# Patient Record
Sex: MALE | Race: WHITE | Employment: OTHER | URBAN - NONMETROPOLITAN AREA
[De-identification: names, ages, dates, MRNs, and addresses within clinical notes are randomized per-mention and may not be internally consistent; named-entity substitution may affect disease eponyms.]

---

## 2018-01-16 PROBLEM — G25.0 ESSENTIAL TREMOR: Status: ACTIVE | Noted: 2018-01-16

## 2018-01-16 RX ORDER — FUROSEMIDE 40 MG
TABLET ORAL
Status: ON HOLD | COMMUNITY
Start: 2008-06-30 | End: 2020-01-01

## 2018-01-16 RX ORDER — ASPIRIN 81 MG/1
TABLET ORAL
Status: ON HOLD | COMMUNITY
Start: 2008-05-27 | End: 2020-01-01

## 2018-01-16 RX ORDER — ZOLPIDEM TARTRATE 10 MG/1
TABLET ORAL
Status: ON HOLD | COMMUNITY
Start: 2008-05-27 | End: 2020-01-01

## 2018-01-16 RX ORDER — DIPHENOXYLATE HYDROCHLORIDE AND ATROPINE SULFATE 2.5; .025 MG/1; MG/1
TABLET ORAL
Status: ON HOLD | COMMUNITY
Start: 2008-05-27 | End: 2020-01-01

## 2018-01-16 RX ORDER — CHLORAL HYDRATE 500 MG
CAPSULE ORAL
Status: ON HOLD | COMMUNITY
Start: 2008-05-27 | End: 2020-01-01

## 2018-01-16 RX ORDER — DIPHENHYDRAMINE HCL 50 MG
CAPSULE ORAL
Status: ON HOLD | COMMUNITY
Start: 2008-05-27 | End: 2020-01-01

## 2020-01-01 ENCOUNTER — ANESTHESIA EVENT (OUTPATIENT)
Dept: MEDSURG UNIT | Facility: OTHER | Age: 85
DRG: 872 | End: 2020-01-01
Payer: MEDICARE

## 2020-01-01 ENCOUNTER — APPOINTMENT (OUTPATIENT)
Dept: GENERAL RADIOLOGY | Facility: OTHER | Age: 85
DRG: 872 | End: 2020-01-01
Attending: INTERNAL MEDICINE
Payer: MEDICARE

## 2020-01-01 ENCOUNTER — HOSPITAL ENCOUNTER (INPATIENT)
Facility: OTHER | Age: 85
LOS: 4 days | Discharge: HOME | DRG: 872 | End: 2020-11-13
Attending: FAMILY MEDICINE | Admitting: FAMILY MEDICINE
Payer: MEDICARE

## 2020-01-01 ENCOUNTER — ANESTHESIA (OUTPATIENT)
Dept: MEDSURG UNIT | Facility: OTHER | Age: 85
DRG: 872 | End: 2020-01-01
Payer: MEDICARE

## 2020-01-01 ENCOUNTER — NURSE TRIAGE (OUTPATIENT)
Dept: NURSING | Facility: CLINIC | Age: 85
End: 2020-01-01

## 2020-01-01 ENCOUNTER — APPOINTMENT (OUTPATIENT)
Dept: OCCUPATIONAL THERAPY | Facility: OTHER | Age: 85
DRG: 872 | End: 2020-01-01
Attending: FAMILY MEDICINE
Payer: MEDICARE

## 2020-01-01 ENCOUNTER — APPOINTMENT (OUTPATIENT)
Dept: PHYSICAL THERAPY | Facility: OTHER | Age: 85
DRG: 872 | End: 2020-01-01
Attending: FAMILY MEDICINE
Payer: MEDICARE

## 2020-01-01 ENCOUNTER — APPOINTMENT (OUTPATIENT)
Dept: GENERAL RADIOLOGY | Facility: OTHER | Age: 85
DRG: 872 | End: 2020-01-01
Attending: FAMILY MEDICINE
Payer: MEDICARE

## 2020-01-01 ENCOUNTER — APPOINTMENT (OUTPATIENT)
Dept: CARDIOLOGY | Facility: OTHER | Age: 85
DRG: 872 | End: 2020-01-01
Attending: FAMILY MEDICINE
Payer: MEDICARE

## 2020-01-01 VITALS
TEMPERATURE: 98 F | BODY MASS INDEX: 31.1 KG/M2 | DIASTOLIC BLOOD PRESSURE: 63 MMHG | OXYGEN SATURATION: 97 % | RESPIRATION RATE: 18 BRPM | HEART RATE: 92 BPM | WEIGHT: 229.6 LBS | HEIGHT: 72 IN | SYSTOLIC BLOOD PRESSURE: 117 MMHG

## 2020-01-01 DIAGNOSIS — L08.9 SKIN INFECTION: Primary | ICD-10-CM

## 2020-01-01 LAB
ALBUMIN SERPL-MCNC: 3.2 G/DL (ref 3.5–5.7)
ALBUMIN SERPL-MCNC: 3.4 G/DL (ref 3.5–5.7)
ALP SERPL-CCNC: 51 U/L (ref 34–104)
ALP SERPL-CCNC: 53 U/L (ref 34–104)
ALT SERPL W P-5'-P-CCNC: 6 U/L (ref 7–52)
ALT SERPL W P-5'-P-CCNC: 6 U/L (ref 7–52)
ANION GAP SERPL CALCULATED.3IONS-SCNC: 10 MMOL/L (ref 3–14)
ANION GAP SERPL CALCULATED.3IONS-SCNC: 10 MMOL/L (ref 3–14)
ANION GAP SERPL CALCULATED.3IONS-SCNC: 11 MMOL/L (ref 3–14)
ANION GAP SERPL CALCULATED.3IONS-SCNC: 11 MMOL/L (ref 3–14)
ANION GAP SERPL CALCULATED.3IONS-SCNC: 9 MMOL/L (ref 3–14)
AST SERPL W P-5'-P-CCNC: 20 U/L (ref 13–39)
AST SERPL W P-5'-P-CCNC: 27 U/L (ref 13–39)
BILIRUB SERPL-MCNC: 0.6 MG/DL (ref 0.3–1)
BILIRUB SERPL-MCNC: 0.8 MG/DL (ref 0.3–1)
BUN SERPL-MCNC: 23 MG/DL (ref 7–25)
BUN SERPL-MCNC: 24 MG/DL (ref 7–25)
BUN SERPL-MCNC: 29 MG/DL (ref 7–25)
BUN SERPL-MCNC: 37 MG/DL (ref 7–25)
BUN SERPL-MCNC: 40 MG/DL (ref 7–25)
CALCIUM SERPL-MCNC: 8.1 MG/DL (ref 8.6–10.3)
CALCIUM SERPL-MCNC: 8.2 MG/DL (ref 8.6–10.3)
CALCIUM SERPL-MCNC: 8.2 MG/DL (ref 8.6–10.3)
CALCIUM SERPL-MCNC: 8.4 MG/DL (ref 8.6–10.3)
CALCIUM SERPL-MCNC: 8.7 MG/DL (ref 8.6–10.3)
CHLORIDE SERPL-SCNC: 101 MMOL/L (ref 98–107)
CHLORIDE SERPL-SCNC: 101 MMOL/L (ref 98–107)
CHLORIDE SERPL-SCNC: 102 MMOL/L (ref 98–107)
CHLORIDE SERPL-SCNC: 103 MMOL/L (ref 98–107)
CHLORIDE SERPL-SCNC: 99 MMOL/L (ref 98–107)
CO2 SERPL-SCNC: 27 MMOL/L (ref 21–31)
CO2 SERPL-SCNC: 27 MMOL/L (ref 21–31)
CO2 SERPL-SCNC: 30 MMOL/L (ref 21–31)
CO2 SERPL-SCNC: 32 MMOL/L (ref 21–31)
CO2 SERPL-SCNC: 32 MMOL/L (ref 21–31)
CREAT SERPL-MCNC: 1.2 MG/DL (ref 0.7–1.3)
CREAT SERPL-MCNC: 1.27 MG/DL (ref 0.7–1.3)
CREAT SERPL-MCNC: 1.31 MG/DL (ref 0.7–1.3)
CREAT SERPL-MCNC: 1.65 MG/DL (ref 0.7–1.3)
CREAT SERPL-MCNC: 1.83 MG/DL (ref 0.7–1.3)
ERYTHROCYTE [DISTWIDTH] IN BLOOD BY AUTOMATED COUNT: 14 % (ref 10–15)
ERYTHROCYTE [DISTWIDTH] IN BLOOD BY AUTOMATED COUNT: 14.1 % (ref 10–15)
ERYTHROCYTE [DISTWIDTH] IN BLOOD BY AUTOMATED COUNT: 14.1 % (ref 10–15)
ERYTHROCYTE [DISTWIDTH] IN BLOOD BY AUTOMATED COUNT: 14.2 % (ref 10–15)
GFR SERPL CREATININE-BSD FRML MDRD: 35 ML/MIN/{1.73_M2}
GFR SERPL CREATININE-BSD FRML MDRD: 39 ML/MIN/{1.73_M2}
GFR SERPL CREATININE-BSD FRML MDRD: 51 ML/MIN/{1.73_M2}
GFR SERPL CREATININE-BSD FRML MDRD: 53 ML/MIN/{1.73_M2}
GFR SERPL CREATININE-BSD FRML MDRD: 57 ML/MIN/{1.73_M2}
GLUCOSE SERPL-MCNC: 102 MG/DL (ref 70–105)
GLUCOSE SERPL-MCNC: 120 MG/DL (ref 70–105)
GLUCOSE SERPL-MCNC: 135 MG/DL (ref 70–105)
GLUCOSE SERPL-MCNC: 137 MG/DL (ref 70–105)
GLUCOSE SERPL-MCNC: 99 MG/DL (ref 70–105)
HCT VFR BLD AUTO: 32.3 % (ref 40–53)
HCT VFR BLD AUTO: 32.7 % (ref 40–53)
HCT VFR BLD AUTO: 34.2 % (ref 40–53)
HCT VFR BLD AUTO: 35.3 % (ref 40–53)
HGB BLD-MCNC: 10.6 G/DL (ref 13.3–17.7)
HGB BLD-MCNC: 10.6 G/DL (ref 13.3–17.7)
HGB BLD-MCNC: 11.1 G/DL (ref 13.3–17.7)
HGB BLD-MCNC: 11.1 G/DL (ref 13.3–17.7)
INR PPP: 1.9 (ref 0.86–1.14)
INR PPP: 1.98 (ref 0.86–1.14)
INTERPRETATION ECG - MUSE: NORMAL
INTERPRETATION ECG - MUSE: NORMAL
LACTATE BLD-SCNC: 1.5 MMOL/L (ref 0.7–2)
MAGNESIUM SERPL-MCNC: 2.1 MG/DL (ref 1.9–2.7)
MCH RBC QN AUTO: 30.3 PG (ref 26.5–33)
MCH RBC QN AUTO: 30.7 PG (ref 26.5–33)
MCH RBC QN AUTO: 31.1 PG (ref 26.5–33)
MCH RBC QN AUTO: 31.1 PG (ref 26.5–33)
MCHC RBC AUTO-ENTMCNC: 31.4 G/DL (ref 31.5–36.5)
MCHC RBC AUTO-ENTMCNC: 32.4 G/DL (ref 31.5–36.5)
MCHC RBC AUTO-ENTMCNC: 32.5 G/DL (ref 31.5–36.5)
MCHC RBC AUTO-ENTMCNC: 32.8 G/DL (ref 31.5–36.5)
MCV RBC AUTO: 95 FL (ref 78–100)
MCV RBC AUTO: 95 FL (ref 78–100)
MCV RBC AUTO: 96 FL (ref 78–100)
MCV RBC AUTO: 96 FL (ref 78–100)
NT-PROBNP SERPL-MCNC: 734 PG/ML (ref 0–100)
PLATELET # BLD AUTO: 205 10E9/L (ref 150–450)
PLATELET # BLD AUTO: 214 10E9/L (ref 150–450)
PLATELET # BLD AUTO: 221 10E9/L (ref 150–450)
PLATELET # BLD AUTO: 227 10E9/L (ref 150–450)
POTASSIUM SERPL-SCNC: 3.1 MMOL/L (ref 3.5–5.1)
POTASSIUM SERPL-SCNC: 3.1 MMOL/L (ref 3.5–5.1)
POTASSIUM SERPL-SCNC: 3.2 MMOL/L (ref 3.5–5.1)
POTASSIUM SERPL-SCNC: 3.5 MMOL/L (ref 3.5–5.1)
POTASSIUM SERPL-SCNC: 3.6 MMOL/L (ref 3.5–5.1)
POTASSIUM SERPL-SCNC: 4.2 MMOL/L (ref 3.5–5.1)
PROCALCITONIN SERPL-MCNC: 1.73 NG/ML
PROCALCITONIN SERPL-MCNC: 2.72 NG/ML
PROT SERPL-MCNC: 5.7 G/DL (ref 6.4–8.9)
PROT SERPL-MCNC: 6.1 G/DL (ref 6.4–8.9)
RBC # BLD AUTO: 3.41 10E12/L (ref 4.4–5.9)
RBC # BLD AUTO: 3.41 10E12/L (ref 4.4–5.9)
RBC # BLD AUTO: 3.61 10E12/L (ref 4.4–5.9)
RBC # BLD AUTO: 3.66 10E12/L (ref 4.4–5.9)
SODIUM SERPL-SCNC: 139 MMOL/L (ref 134–144)
SODIUM SERPL-SCNC: 139 MMOL/L (ref 134–144)
SODIUM SERPL-SCNC: 140 MMOL/L (ref 134–144)
SODIUM SERPL-SCNC: 143 MMOL/L (ref 134–144)
SODIUM SERPL-SCNC: 144 MMOL/L (ref 134–144)
TROPONIN I SERPL-MCNC: 10.8 PG/ML
TSH SERPL DL<=0.05 MIU/L-ACNC: 1.21 IU/ML (ref 0.34–5.6)
WBC # BLD AUTO: 10.2 10E9/L (ref 4–11)
WBC # BLD AUTO: 10.6 10E9/L (ref 4–11)
WBC # BLD AUTO: 10.9 10E9/L (ref 4–11)
WBC # BLD AUTO: 8.8 10E9/L (ref 4–11)

## 2020-01-01 PROCEDURE — 99233 SBSQ HOSP IP/OBS HIGH 50: CPT | Performed by: FAMILY MEDICINE

## 2020-01-01 PROCEDURE — 99232 SBSQ HOSP IP/OBS MODERATE 35: CPT | Performed by: FAMILY MEDICINE

## 2020-01-01 PROCEDURE — G0378 HOSPITAL OBSERVATION PER HR: HCPCS

## 2020-01-01 PROCEDURE — 84443 ASSAY THYROID STIM HORMONE: CPT | Performed by: FAMILY MEDICINE

## 2020-01-01 PROCEDURE — 36415 COLL VENOUS BLD VENIPUNCTURE: CPT | Performed by: FAMILY MEDICINE

## 2020-01-01 PROCEDURE — 250N000011 HC RX IP 250 OP 636: Performed by: FAMILY MEDICINE

## 2020-01-01 PROCEDURE — 84145 PROCALCITONIN (PCT): CPT | Performed by: FAMILY MEDICINE

## 2020-01-01 PROCEDURE — 97116 GAIT TRAINING THERAPY: CPT | Mod: GP

## 2020-01-01 PROCEDURE — 85027 COMPLETE CBC AUTOMATED: CPT | Performed by: FAMILY MEDICINE

## 2020-01-01 PROCEDURE — 84484 ASSAY OF TROPONIN QUANT: CPT | Performed by: FAMILY MEDICINE

## 2020-01-01 PROCEDURE — 83605 ASSAY OF LACTIC ACID: CPT | Performed by: FAMILY MEDICINE

## 2020-01-01 PROCEDURE — 99100 ANES PT EXTEME AGE<1 YR&>70: CPT | Performed by: NURSE ANESTHETIST, CERTIFIED REGISTERED

## 2020-01-01 PROCEDURE — 85610 PROTHROMBIN TIME: CPT | Performed by: FAMILY MEDICINE

## 2020-01-01 PROCEDURE — 92960 CARDIOVERSION ELECTRIC EXT: CPT | Performed by: FAMILY MEDICINE

## 2020-01-01 PROCEDURE — 80048 BASIC METABOLIC PNL TOTAL CA: CPT | Performed by: FAMILY MEDICINE

## 2020-01-01 PROCEDURE — 83880 ASSAY OF NATRIURETIC PEPTIDE: CPT | Performed by: FAMILY MEDICINE

## 2020-01-01 PROCEDURE — 93306 TTE W/DOPPLER COMPLETE: CPT

## 2020-01-01 PROCEDURE — 999N000157 HC STATISTIC RCP TIME EA 10 MIN

## 2020-01-01 PROCEDURE — 120N000001 HC R&B MED SURG/OB

## 2020-01-01 PROCEDURE — 250N000013 HC RX MED GY IP 250 OP 250 PS 637: Performed by: FAMILY MEDICINE

## 2020-01-01 PROCEDURE — 80053 COMPREHEN METABOLIC PANEL: CPT | Performed by: FAMILY MEDICINE

## 2020-01-01 PROCEDURE — 02HV33Z INSERTION OF INFUSION DEVICE INTO SUPERIOR VENA CAVA, PERCUTANEOUS APPROACH: ICD-10-PCS | Performed by: INTERNAL MEDICINE

## 2020-01-01 PROCEDURE — 250N000009 HC RX 250: Performed by: FAMILY MEDICINE

## 2020-01-01 PROCEDURE — 92960 CARDIOVERSION ELECTRIC EXT: CPT | Performed by: NURSE ANESTHETIST, CERTIFIED REGISTERED

## 2020-01-01 PROCEDURE — 250N000013 HC RX MED GY IP 250 OP 250 PS 637: Performed by: INTERNAL MEDICINE

## 2020-01-01 PROCEDURE — 97530 THERAPEUTIC ACTIVITIES: CPT | Mod: GP

## 2020-01-01 PROCEDURE — 71046 X-RAY EXAM CHEST 2 VIEWS: CPT

## 2020-01-01 PROCEDURE — 258N000003 HC RX IP 258 OP 636: Performed by: FAMILY MEDICINE

## 2020-01-01 PROCEDURE — 93010 ELECTROCARDIOGRAM REPORT: CPT | Performed by: INTERNAL MEDICINE

## 2020-01-01 PROCEDURE — 200N000001 HC R&B ICU

## 2020-01-01 PROCEDURE — 5A2204Z RESTORATION OF CARDIAC RHYTHM, SINGLE: ICD-10-PCS | Performed by: FAMILY MEDICINE

## 2020-01-01 PROCEDURE — 97165 OT EVAL LOW COMPLEX 30 MIN: CPT | Mod: GO

## 2020-01-01 PROCEDURE — 99223 1ST HOSP IP/OBS HIGH 75: CPT | Mod: 25 | Performed by: FAMILY MEDICINE

## 2020-01-01 PROCEDURE — 93306 TTE W/DOPPLER COMPLETE: CPT | Mod: 26 | Performed by: INTERNAL MEDICINE

## 2020-01-01 PROCEDURE — 99140 ANES COMP EMERGENCY COND: CPT | Performed by: NURSE ANESTHETIST, CERTIFIED REGISTERED

## 2020-01-01 PROCEDURE — 84132 ASSAY OF SERUM POTASSIUM: CPT | Performed by: FAMILY MEDICINE

## 2020-01-01 PROCEDURE — 97535 SELF CARE MNGMENT TRAINING: CPT | Mod: GO

## 2020-01-01 PROCEDURE — 99239 HOSP IP/OBS DSCHRG MGMT >30: CPT | Performed by: FAMILY MEDICINE

## 2020-01-01 PROCEDURE — 71045 X-RAY EXAM CHEST 1 VIEW: CPT

## 2020-01-01 PROCEDURE — 93005 ELECTROCARDIOGRAM TRACING: CPT

## 2020-01-01 PROCEDURE — 83735 ASSAY OF MAGNESIUM: CPT | Performed by: FAMILY MEDICINE

## 2020-01-01 PROCEDURE — 97530 THERAPEUTIC ACTIVITIES: CPT | Mod: GO

## 2020-01-01 PROCEDURE — 258N000003 HC RX IP 258 OP 636: Performed by: NURSE ANESTHETIST, CERTIFIED REGISTERED

## 2020-01-01 PROCEDURE — 36556 INSERT NON-TUNNEL CV CATH: CPT | Performed by: INTERNAL MEDICINE

## 2020-01-01 PROCEDURE — 250N000011 HC RX IP 250 OP 636: Performed by: NURSE ANESTHETIST, CERTIFIED REGISTERED

## 2020-01-01 PROCEDURE — 97161 PT EVAL LOW COMPLEX 20 MIN: CPT | Mod: GP

## 2020-01-01 RX ORDER — ZOLPIDEM TARTRATE 5 MG/1
5 TABLET ORAL
Status: DISCONTINUED | OUTPATIENT
Start: 2020-01-01 | End: 2020-01-01 | Stop reason: HOSPADM

## 2020-01-01 RX ORDER — POTASSIUM CHLORIDE 7.45 MG/ML
10 INJECTION INTRAVENOUS ONCE
Status: COMPLETED | OUTPATIENT
Start: 2020-01-01 | End: 2020-01-01

## 2020-01-01 RX ORDER — METOPROLOL SUCCINATE 50 MG/1
50 TABLET, EXTENDED RELEASE ORAL DAILY
Status: DISCONTINUED | OUTPATIENT
Start: 2020-01-01 | End: 2020-01-01

## 2020-01-01 RX ORDER — LIDOCAINE 40 MG/G
CREAM TOPICAL
Status: DISCONTINUED | OUTPATIENT
Start: 2020-01-01 | End: 2020-01-01 | Stop reason: HOSPADM

## 2020-01-01 RX ORDER — FUROSEMIDE 10 MG/ML
60 INJECTION INTRAMUSCULAR; INTRAVENOUS EVERY 8 HOURS
Status: DISCONTINUED | OUTPATIENT
Start: 2020-01-01 | End: 2020-01-01

## 2020-01-01 RX ORDER — FUROSEMIDE 80 MG
80 TABLET ORAL 2 TIMES DAILY
COMMUNITY
Start: 2019-12-06

## 2020-01-01 RX ORDER — ACETAMINOPHEN 325 MG/1
650 TABLET ORAL EVERY 4 HOURS PRN
Status: DISCONTINUED | OUTPATIENT
Start: 2020-01-01 | End: 2020-01-01 | Stop reason: HOSPADM

## 2020-01-01 RX ORDER — ASPIRIN 81 MG/1
81 TABLET ORAL DAILY
Status: DISCONTINUED | OUTPATIENT
Start: 2020-01-01 | End: 2020-01-01

## 2020-01-01 RX ORDER — FUROSEMIDE 10 MG/ML
40 INJECTION INTRAMUSCULAR; INTRAVENOUS EVERY 8 HOURS
Status: DISCONTINUED | OUTPATIENT
Start: 2020-01-01 | End: 2020-01-01

## 2020-01-01 RX ORDER — BENZOCAINE/MENTHOL 6 MG-10 MG
LOZENGE MUCOUS MEMBRANE 2 TIMES DAILY PRN
Status: DISCONTINUED | OUTPATIENT
Start: 2020-01-01 | End: 2020-01-01 | Stop reason: HOSPADM

## 2020-01-01 RX ORDER — CEFAZOLIN SODIUM 1 G/50ML
1 INJECTION, SOLUTION INTRAVENOUS EVERY 8 HOURS SCHEDULED
Status: DISCONTINUED | OUTPATIENT
Start: 2020-01-01 | End: 2020-01-01

## 2020-01-01 RX ORDER — FUROSEMIDE 80 MG
80 TABLET ORAL
Status: DISCONTINUED | OUTPATIENT
Start: 2020-01-01 | End: 2020-01-01

## 2020-01-01 RX ORDER — POTASSIUM CHLORIDE 1.5 G/1.58G
40 POWDER, FOR SOLUTION ORAL ONCE
Status: COMPLETED | OUTPATIENT
Start: 2020-01-01 | End: 2020-01-01

## 2020-01-01 RX ORDER — FUROSEMIDE 40 MG
40 TABLET ORAL DAILY
Status: DISCONTINUED | OUTPATIENT
Start: 2020-01-01 | End: 2020-01-01

## 2020-01-01 RX ORDER — METOPROLOL TARTRATE 1 MG/ML
5 INJECTION, SOLUTION INTRAVENOUS EVERY 5 MIN PRN
Status: DISCONTINUED | OUTPATIENT
Start: 2020-01-01 | End: 2020-01-01

## 2020-01-01 RX ORDER — PROPOFOL 10 MG/ML
INJECTION, EMULSION INTRAVENOUS PRN
Status: DISCONTINUED | OUTPATIENT
Start: 2020-01-01 | End: 2020-01-01

## 2020-01-01 RX ORDER — ACETAMINOPHEN 650 MG/1
650 SUPPOSITORY RECTAL EVERY 4 HOURS PRN
Status: DISCONTINUED | OUTPATIENT
Start: 2020-01-01 | End: 2020-01-01 | Stop reason: HOSPADM

## 2020-01-01 RX ORDER — POTASSIUM CHLORIDE 1500 MG/1
40 TABLET, EXTENDED RELEASE ORAL ONCE
Status: COMPLETED | OUTPATIENT
Start: 2020-01-01 | End: 2020-01-01

## 2020-01-01 RX ORDER — PRAMIPEXOLE DIHYDROCHLORIDE 0.12 MG/1
0.12 TABLET ORAL
Status: DISCONTINUED | OUTPATIENT
Start: 2020-01-01 | End: 2020-01-01 | Stop reason: HOSPADM

## 2020-01-01 RX ORDER — LEVOTHYROXINE SODIUM 75 UG/1
75 TABLET ORAL DAILY
Status: DISCONTINUED | OUTPATIENT
Start: 2020-01-01 | End: 2020-01-01 | Stop reason: HOSPADM

## 2020-01-01 RX ORDER — ONDANSETRON 2 MG/ML
4 INJECTION INTRAMUSCULAR; INTRAVENOUS EVERY 6 HOURS PRN
Status: DISCONTINUED | OUTPATIENT
Start: 2020-01-01 | End: 2020-01-01 | Stop reason: HOSPADM

## 2020-01-01 RX ORDER — PRAMIPEXOLE DIHYDROCHLORIDE 0.12 MG/1
0.12 TABLET ORAL 2 TIMES DAILY
COMMUNITY
Start: 2020-01-01

## 2020-01-01 RX ORDER — NOREPINEPHRINE BITARTRATE 0.02 MG/ML
.03-.125 INJECTION, SOLUTION INTRAVENOUS CONTINUOUS
Status: DISCONTINUED | OUTPATIENT
Start: 2020-01-01 | End: 2020-01-01

## 2020-01-01 RX ORDER — NOREPINEPHRINE BITARTRATE 0.06 MG/ML
.03-.4 INJECTION, SOLUTION INTRAVENOUS CONTINUOUS
Status: DISCONTINUED | OUTPATIENT
Start: 2020-01-01 | End: 2020-01-01

## 2020-01-01 RX ORDER — PROCHLORPERAZINE 25 MG
12.5 SUPPOSITORY, RECTAL RECTAL EVERY 12 HOURS PRN
Status: DISCONTINUED | OUTPATIENT
Start: 2020-01-01 | End: 2020-01-01 | Stop reason: HOSPADM

## 2020-01-01 RX ORDER — HYDROXYZINE PAMOATE 25 MG/1
25 CAPSULE ORAL 3 TIMES DAILY PRN
Status: DISCONTINUED | OUTPATIENT
Start: 2020-01-01 | End: 2020-01-01 | Stop reason: HOSPADM

## 2020-01-01 RX ORDER — LEVOTHYROXINE SODIUM 75 UG/1
75 TABLET ORAL DAILY
COMMUNITY
Start: 2020-02-20

## 2020-01-01 RX ORDER — PRIMIDONE 50 MG/1
25 TABLET ORAL DAILY
COMMUNITY
Start: 2020-01-01

## 2020-01-01 RX ORDER — FUROSEMIDE 80 MG
80 TABLET ORAL
Status: DISCONTINUED | OUTPATIENT
Start: 2020-01-01 | End: 2020-01-01 | Stop reason: HOSPADM

## 2020-01-01 RX ORDER — TRAMADOL HYDROCHLORIDE 50 MG/1
50 TABLET ORAL 2 TIMES DAILY PRN
COMMUNITY
Start: 2020-01-01

## 2020-01-01 RX ORDER — NALOXONE HYDROCHLORIDE 0.4 MG/ML
.1-.4 INJECTION, SOLUTION INTRAMUSCULAR; INTRAVENOUS; SUBCUTANEOUS
Status: DISCONTINUED | OUTPATIENT
Start: 2020-01-01 | End: 2020-01-01 | Stop reason: HOSPADM

## 2020-01-01 RX ORDER — ONDANSETRON 4 MG/1
4 TABLET, ORALLY DISINTEGRATING ORAL EVERY 6 HOURS PRN
Status: DISCONTINUED | OUTPATIENT
Start: 2020-01-01 | End: 2020-01-01 | Stop reason: HOSPADM

## 2020-01-01 RX ORDER — METOPROLOL SUCCINATE 100 MG/1
200 TABLET, EXTENDED RELEASE ORAL DAILY
Status: DISCONTINUED | OUTPATIENT
Start: 2020-01-01 | End: 2020-01-01

## 2020-01-01 RX ORDER — SODIUM CHLORIDE 9 MG/ML
INJECTION, SOLUTION INTRAVENOUS CONTINUOUS
Status: DISCONTINUED | OUTPATIENT
Start: 2020-01-01 | End: 2020-01-01

## 2020-01-01 RX ORDER — HYDROCODONE BITARTRATE AND ACETAMINOPHEN 5; 325 MG/1; MG/1
1-2 TABLET ORAL EVERY 4 HOURS PRN
Status: DISCONTINUED | OUTPATIENT
Start: 2020-01-01 | End: 2020-01-01 | Stop reason: HOSPADM

## 2020-01-01 RX ORDER — PROCHLORPERAZINE MALEATE 5 MG
5 TABLET ORAL EVERY 6 HOURS PRN
Status: DISCONTINUED | OUTPATIENT
Start: 2020-01-01 | End: 2020-01-01 | Stop reason: HOSPADM

## 2020-01-01 RX ORDER — METOPROLOL SUCCINATE 100 MG/1
200 TABLET, EXTENDED RELEASE ORAL DAILY
COMMUNITY
Start: 2020-01-01

## 2020-01-01 RX ORDER — POTASSIUM CHLORIDE 750 MG/1
20 TABLET, EXTENDED RELEASE ORAL 2 TIMES DAILY
COMMUNITY
Start: 2020-01-22

## 2020-01-01 RX ORDER — TRAMADOL HYDROCHLORIDE 50 MG/1
50 TABLET ORAL EVERY 6 HOURS PRN
Status: DISCONTINUED | OUTPATIENT
Start: 2020-01-01 | End: 2020-01-01 | Stop reason: HOSPADM

## 2020-01-01 RX ADMIN — PHENYLEPHRINE HYDROCHLORIDE 150 MCG: 10 INJECTION INTRAVENOUS at 01:41

## 2020-01-01 RX ADMIN — FUROSEMIDE 40 MG: 10 INJECTION, SOLUTION INTRAVENOUS at 11:57

## 2020-01-01 RX ADMIN — POTASSIUM CHLORIDE 40 MEQ: 1500 TABLET, EXTENDED RELEASE ORAL at 06:30

## 2020-01-01 RX ADMIN — FUROSEMIDE 40 MG: 10 INJECTION, SOLUTION INTRAVENOUS at 10:37

## 2020-01-01 RX ADMIN — POTASSIUM CHLORIDE 10 MEQ: 7.46 INJECTION, SOLUTION INTRAVENOUS at 12:17

## 2020-01-01 RX ADMIN — QUETIAPINE 12.5 MG: 25 TABLET ORAL at 21:26

## 2020-01-01 RX ADMIN — NOREPINEPHRINE BITARTRATE 4 MG/250 ML (16 MCG/ML) IN 0.9 % NACL IV 0.07 MCG/KG/MIN: at 10:32

## 2020-01-01 RX ADMIN — PRAMIPEXOLE DIHYDROCHLORIDE 0.12 MG: 0.12 TABLET ORAL at 19:56

## 2020-01-01 RX ADMIN — LEVOTHYROXINE SODIUM 75 MCG: 0.07 TABLET ORAL at 07:52

## 2020-01-01 RX ADMIN — PROPOFOL 50 MG: 10 INJECTION, EMULSION INTRAVENOUS at 01:43

## 2020-01-01 RX ADMIN — HYDROXYZINE PAMOATE 25 MG: 25 CAPSULE ORAL at 18:38

## 2020-01-01 RX ADMIN — FUROSEMIDE 40 MG: 10 INJECTION, SOLUTION INTRAVENOUS at 09:21

## 2020-01-01 RX ADMIN — FUROSEMIDE 80 MG: 80 TABLET ORAL at 15:07

## 2020-01-01 RX ADMIN — AMOXICILLIN AND CLAVULANATE POTASSIUM 1 TABLET: 875; 125 TABLET, FILM COATED ORAL at 19:38

## 2020-01-01 RX ADMIN — METOPROLOL TARTRATE 5 MG: 1 INJECTION, SOLUTION INTRAVENOUS at 01:08

## 2020-01-01 RX ADMIN — FUROSEMIDE 40 MG: 10 INJECTION, SOLUTION INTRAVENOUS at 18:38

## 2020-01-01 RX ADMIN — RIVAROXABAN 15 MG: 15 TABLET, FILM COATED ORAL at 09:20

## 2020-01-01 RX ADMIN — FUROSEMIDE 40 MG: 10 INJECTION, SOLUTION INTRAVENOUS at 09:10

## 2020-01-01 RX ADMIN — HYDROCODONE BITARTRATE AND ACETAMINOPHEN 2 TABLET: 5; 325 TABLET ORAL at 23:29

## 2020-01-01 RX ADMIN — HYDROCORTISONE: 1 CREAM TOPICAL at 03:24

## 2020-01-01 RX ADMIN — TRAMADOL HYDROCHLORIDE 50 MG: 50 TABLET, FILM COATED ORAL at 06:25

## 2020-01-01 RX ADMIN — FUROSEMIDE 40 MG: 10 INJECTION, SOLUTION INTRAVENOUS at 17:35

## 2020-01-01 RX ADMIN — FUROSEMIDE 80 MG: 80 TABLET ORAL at 07:41

## 2020-01-01 RX ADMIN — PRAMIPEXOLE DIHYDROCHLORIDE 0.12 MG: 0.12 TABLET ORAL at 22:08

## 2020-01-01 RX ADMIN — LEVOTHYROXINE SODIUM 75 MCG: 0.07 TABLET ORAL at 07:44

## 2020-01-01 RX ADMIN — NOREPINEPHRINE BITARTRATE 4 MG/250 ML (16 MCG/ML) IN 0.9 % NACL IV 0.03 MCG/KG/MIN: at 02:21

## 2020-01-01 RX ADMIN — HYDROXYZINE PAMOATE 25 MG: 25 CAPSULE ORAL at 15:18

## 2020-01-01 RX ADMIN — POTASSIUM CHLORIDE 10 MEQ: 7.46 INJECTION, SOLUTION INTRAVENOUS at 01:44

## 2020-01-01 RX ADMIN — ACETAMINOPHEN 650 MG: 325 TABLET, FILM COATED ORAL at 02:41

## 2020-01-01 RX ADMIN — CEFAZOLIN SODIUM 1 G: 1 INJECTION, SOLUTION INTRAVENOUS at 14:25

## 2020-01-01 RX ADMIN — FUROSEMIDE 40 MG: 10 INJECTION, SOLUTION INTRAVENOUS at 02:18

## 2020-01-01 RX ADMIN — CEFAZOLIN SODIUM 1 G: 1 INJECTION, SOLUTION INTRAVENOUS at 22:23

## 2020-01-01 RX ADMIN — PHENYLEPHRINE HYDROCHLORIDE 150 MCG: 10 INJECTION INTRAVENOUS at 01:46

## 2020-01-01 RX ADMIN — CEFAZOLIN SODIUM 1 G: 1 INJECTION, SOLUTION INTRAVENOUS at 07:15

## 2020-01-01 RX ADMIN — FUROSEMIDE 40 MG: 10 INJECTION, SOLUTION INTRAVENOUS at 02:20

## 2020-01-01 RX ADMIN — FUROSEMIDE 40 MG: 10 INJECTION, SOLUTION INTRAVENOUS at 01:37

## 2020-01-01 RX ADMIN — POTASSIUM CHLORIDE 10 MEQ: 7.46 INJECTION, SOLUTION INTRAVENOUS at 14:21

## 2020-01-01 RX ADMIN — CEFAZOLIN SODIUM 1 G: 1 INJECTION, SOLUTION INTRAVENOUS at 22:08

## 2020-01-01 RX ADMIN — FUROSEMIDE 40 MG: 10 INJECTION, SOLUTION INTRAVENOUS at 18:28

## 2020-01-01 RX ADMIN — RIVAROXABAN 15 MG: 15 TABLET, FILM COATED ORAL at 10:44

## 2020-01-01 RX ADMIN — SODIUM CHLORIDE: 9 INJECTION, SOLUTION INTRAVENOUS at 14:22

## 2020-01-01 RX ADMIN — ACETAMINOPHEN 650 MG: 325 TABLET, FILM COATED ORAL at 13:35

## 2020-01-01 RX ADMIN — AMOXICILLIN AND CLAVULANATE POTASSIUM 1 TABLET: 875; 125 TABLET, FILM COATED ORAL at 12:27

## 2020-01-01 RX ADMIN — CEFAZOLIN SODIUM 1 G: 1 INJECTION, SOLUTION INTRAVENOUS at 14:40

## 2020-01-01 RX ADMIN — PHENYLEPHRINE HYDROCHLORIDE 100 MCG: 10 INJECTION INTRAVENOUS at 01:48

## 2020-01-01 RX ADMIN — LEVOTHYROXINE SODIUM 75 MCG: 0.07 TABLET ORAL at 08:39

## 2020-01-01 RX ADMIN — POTASSIUM CHLORIDE 10 MEQ: 7.46 INJECTION, SOLUTION INTRAVENOUS at 09:43

## 2020-01-01 RX ADMIN — HYDROCODONE BITARTRATE AND ACETAMINOPHEN 1 TABLET: 5; 325 TABLET ORAL at 14:41

## 2020-01-01 RX ADMIN — FUROSEMIDE 40 MG: 10 INJECTION, SOLUTION INTRAVENOUS at 02:38

## 2020-01-01 RX ADMIN — HYDROXYZINE PAMOATE 25 MG: 25 CAPSULE ORAL at 12:59

## 2020-01-01 RX ADMIN — POTASSIUM CHLORIDE 10 MEQ: 7.46 INJECTION, SOLUTION INTRAVENOUS at 02:58

## 2020-01-01 RX ADMIN — RIVAROXABAN 15 MG: 15 TABLET, FILM COATED ORAL at 10:36

## 2020-01-01 RX ADMIN — HYDROXYZINE PAMOATE 25 MG: 25 CAPSULE ORAL at 00:39

## 2020-01-01 RX ADMIN — PHENYLEPHRINE HYDROCHLORIDE 200 MCG: 10 INJECTION INTRAVENOUS at 02:04

## 2020-01-01 RX ADMIN — HYDROXYZINE PAMOATE 25 MG: 25 CAPSULE ORAL at 02:18

## 2020-01-01 RX ADMIN — SODIUM CHLORIDE: 9 INJECTION, SOLUTION INTRAVENOUS at 02:38

## 2020-01-01 RX ADMIN — CEFAZOLIN SODIUM 1 G: 1 INJECTION, SOLUTION INTRAVENOUS at 22:13

## 2020-01-01 RX ADMIN — LEVOTHYROXINE SODIUM 75 MCG: 0.07 TABLET ORAL at 07:15

## 2020-01-01 RX ADMIN — CEFAZOLIN SODIUM 1 G: 1 INJECTION, SOLUTION INTRAVENOUS at 06:25

## 2020-01-01 RX ADMIN — NOREPINEPHRINE BITARTRATE 4 MG/250 ML (16 MCG/ML) IN 0.9 % NACL IV 0.08 MCG/KG/MIN: at 06:23

## 2020-01-01 RX ADMIN — CEFAZOLIN SODIUM 1 G: 1 INJECTION, SOLUTION INTRAVENOUS at 00:32

## 2020-01-01 RX ADMIN — NOREPINEPHRINE BITARTRATE 4 MG/250 ML (16 MCG/ML) IN 0.9 % NACL IV 0.06 MCG/KG/MIN: at 22:10

## 2020-01-01 RX ADMIN — CEFAZOLIN SODIUM 1 G: 1 INJECTION, SOLUTION INTRAVENOUS at 06:09

## 2020-01-01 RX ADMIN — LEVOTHYROXINE SODIUM 75 MCG: 0.07 TABLET ORAL at 06:30

## 2020-01-01 RX ADMIN — POTASSIUM CHLORIDE 10 MEQ: 7.46 INJECTION, SOLUTION INTRAVENOUS at 10:52

## 2020-01-01 RX ADMIN — HYDROXYZINE PAMOATE 25 MG: 25 CAPSULE ORAL at 22:21

## 2020-01-01 RX ADMIN — FUROSEMIDE 80 MG: 80 TABLET ORAL at 13:35

## 2020-01-01 RX ADMIN — PHENYLEPHRINE HYDROCHLORIDE 200 MCG: 10 INJECTION INTRAVENOUS at 01:59

## 2020-01-01 RX ADMIN — TRAMADOL HYDROCHLORIDE 50 MG: 50 TABLET, FILM COATED ORAL at 22:19

## 2020-01-01 RX ADMIN — CEFAZOLIN SODIUM 1 G: 1 INJECTION, SOLUTION INTRAVENOUS at 15:04

## 2020-01-01 RX ADMIN — ACETAMINOPHEN 650 MG: 325 TABLET, FILM COATED ORAL at 07:51

## 2020-01-01 RX ADMIN — ACETAMINOPHEN 650 MG: 325 TABLET, FILM COATED ORAL at 09:22

## 2020-01-01 RX ADMIN — CEFAZOLIN SODIUM 1 G: 1 INJECTION, SOLUTION INTRAVENOUS at 06:01

## 2020-01-01 RX ADMIN — RIVAROXABAN 15 MG: 15 TABLET, FILM COATED ORAL at 09:10

## 2020-01-01 RX ADMIN — RIVAROXABAN 15 MG: 15 TABLET, FILM COATED ORAL at 12:04

## 2020-01-01 RX ADMIN — POTASSIUM CHLORIDE 40 MEQ: 1.5 POWDER, FOR SOLUTION ORAL at 08:37

## 2020-01-01 RX ADMIN — AMOXICILLIN AND CLAVULANATE POTASSIUM 1 TABLET: 875; 125 TABLET, FILM COATED ORAL at 07:41

## 2020-01-01 ASSESSMENT — MIFFLIN-ST. JEOR
SCORE: 1765
SCORE: 1734.46
SCORE: 1779
SCORE: 1752

## 2020-01-01 ASSESSMENT — ACTIVITIES OF DAILY LIVING (ADL)
ADLS_ACUITY_SCORE: 19
ADLS_ACUITY_SCORE: 19
DIFFICULTY_EATING/SWALLOWING: NO
ADLS_ACUITY_SCORE: 20
ADLS_ACUITY_SCORE: 24
DRESSING/BATHING_DIFFICULTY: NO
DOING_ERRANDS_INDEPENDENTLY_DIFFICULTY: NO
WALKING_OR_CLIMBING_STAIRS_DIFFICULTY: YES
ADLS_ACUITY_SCORE: 21
ADLS_ACUITY_SCORE: 19
ADLS_ACUITY_SCORE: 20
ADLS_ACUITY_SCORE: 24
ADLS_ACUITY_SCORE: 24
DIFFICULTY_COMMUNICATING: NO
ADLS_ACUITY_SCORE: 20
ADLS_ACUITY_SCORE: 24
ADLS_ACUITY_SCORE: 24
ADLS_ACUITY_SCORE: 19
FALL_HISTORY_WITHIN_LAST_SIX_MONTHS: NO
WALKING_OR_CLIMBING_STAIRS: AMBULATION DIFFICULTY, REQUIRES EQUIPMENT;STAIR CLIMBING DIFFICULTY, REQUIRES EQUIPMENT;TRANSFERRING DIFFICULTY, REQUIRES EQUIPMENT
ADLS_ACUITY_SCORE: 19
ADLS_ACUITY_SCORE: 19
HEARING_DIFFICULTY_OR_DEAF: NO
ADLS_ACUITY_SCORE: 19
EQUIPMENT_CURRENTLY_USED_AT_HOME: WHEELCHAIR, MANUAL;WALKER, STANDARD
ADLS_ACUITY_SCORE: 21
ADLS_ACUITY_SCORE: 20
WEAR_GLASSES_OR_BLIND: YES
ADLS_ACUITY_SCORE: 19
PREVIOUS_RESPONSIBILITIES: MEAL PREP
ADLS_ACUITY_SCORE: 24
CONCENTRATING,_REMEMBERING_OR_MAKING_DECISIONS_DIFFICULTY: NO
ADLS_ACUITY_SCORE: 24
ADLS_ACUITY_SCORE: 19
ADLS_ACUITY_SCORE: 24
TOILETING_ISSUES: NO
ADLS_ACUITY_SCORE: 21
VISION_MANAGEMENT: GLASSES
ADLS_ACUITY_SCORE: 24

## 2020-01-01 ASSESSMENT — ENCOUNTER SYMPTOMS: DYSRHYTHMIAS: 1

## 2020-11-08 PROBLEM — Z79.891 LONG TERM (CURRENT) USE OF OPIATE ANALGESIC: Status: ACTIVE | Noted: 2020-01-01

## 2020-11-08 PROBLEM — L08.9 SKIN INFECTION: Status: ACTIVE | Noted: 2020-01-01

## 2020-11-08 PROBLEM — G89.4 CHRONIC PAIN DISORDER: Status: ACTIVE | Noted: 2020-02-27

## 2020-11-08 PROBLEM — I48.91 ATRIAL FIBRILLATION, UNSPECIFIED TYPE (H): Status: ACTIVE | Noted: 2019-06-25

## 2020-11-09 PROBLEM — I50.9 HEART FAILURE (H): Status: ACTIVE | Noted: 2020-01-01

## 2020-11-09 NOTE — PROGRESS NOTES
Patient premedicated and then shocked with 150 Joules.  Converted to a sinus rhythm, confirmed on a 12 lead EKG.  BP remains hypotensive.

## 2020-11-09 NOTE — ANESTHESIA PREPROCEDURE EVALUATION
Anesthesia Pre-Procedure Evaluation    Patient: Tim Gallardo   MRN: [id number] : 1929          Preoperative Diagnosis: * No pre-op diagnosis entered *    * No procedures listed *    No past medical history on file.  History reviewed. No pertinent surgical history.    Anesthesia Evaluation     .             ROS/MED HX    ENT/Pulmonary:     (+)sleep apnea, uses CPAP , . .    Neurologic:     (+)other neuro Essential Tremor, restless leg syndrome    Cardiovascular: Comment: Lower extremity swelling, current cellulitis     (+) Dyslipidemia, ----. : . . . :. dysrhythmias a-fib, .       METS/Exercise Tolerance:  3 - Able to walk 1-2 blocks without stopping   Hematologic:     (+) Anemia, -      Musculoskeletal:   (+) arthritis,  -       GI/Hepatic:  - neg GI/hepatic ROS       Renal/Genitourinary:     (+) chronic renal disease, type: CRI,       Endo:     (+) type II DM .      Psychiatric:  - neg psychiatric ROS       Infectious Disease:  - neg infectious disease ROS       Malignancy:      - no malignancy   Other:    (+) H/O Chronic Pain,H/O chronic opiod use ,                         Physical Exam  Normal systems: pulmonary    Airway   Mallampati: II  TM distance: >3 FB  Neck ROM: full    Dental   Comment: No loose teeth. Condition ok given patient's age    Cardiovascular   Rhythm and rate: irregular and abnormal      Pulmonary (+) decreased breath sounds               No results found for: WBC, HGB, HCT, PLT, CRP, SED, NA, POTASSIUM, CHLORIDE, CO2, BUN, CR, GLC, AMRIANA, PHOS, MAG, ALBUMIN, PROTTOTAL, ALT, AST, GGT, ALKPHOS, BILITOTAL, BILIDIRECT, LIPASE, AMYLASE, ASHLEIGH, PTT, INR, FIBR, TSH, T4, T3, HCG, HCGS, CKTOTAL, CKMB, TROPN    Preop Vitals  BP Readings from Last 3 Encounters:   20 96/56    Pulse Readings from Last 3 Encounters:   20 122      Resp Readings from Last 3 Encounters:   20 16    SpO2 Readings from Last 3 Encounters:   20 97%      Temp Readings from Last 1 Encounters:    11/08/20 98.2  F (36.8  C) (Tympanic)    Ht Readings from Last 1 Encounters:   11/08/20 1.829 m (6')      Wt Readings from Last 1 Encounters:   11/08/20 109 kg (240 lb 4.8 oz)    Estimated body mass index is 32.59 kg/m  as calculated from the following:    Height as of this encounter: 1.829 m (6').    Weight as of this encounter: 109 kg (240 lb 4.8 oz).       Anesthesia Plan      History & Physical Review      ASA Status:  3 emergent.    NPO Status:  > 6 hours    Plan for MAC (Convert to ETT PRN) with Intravenous and Propofol induction. Reason for MAC:  Chronic cardiopulmonary disease (G9)           Postoperative Care      Consents  Anesthetic plan, risks, benefits and alternatives discussed with:  Patient.  Use of blood products discussed: No .   .                 LUIGI Overton CRNA

## 2020-11-09 NOTE — PROCEDURES
Pt had increased HR, afib RVR up to 120s with hypotension. Unstable narrow complex tachycardia. Decision made for urgent cardioversion. Risks, benefits, side effects and alternatives discussed with patient and his son. Timeout was done. Patient underwent anesthesia. One synchronized shock given at 150J with cardioversion to NSR with PACs. Continues to have some hypotension postprocedure, otherwise doing well.   -patient transferred to ICU  -amiodarone if goes back into afib RVR.   -resume home medications  -remains hypotensive. Start levophed.

## 2020-11-09 NOTE — H&P
[facility]    History and Physical  Hospitalist       Date of Admission:  11/8/2020    Assessment & Plan   Tim Gallardo is a 89 year old male who presents from outside emergency room for bilateral lower leg swelling and redness.    Principal Problem:  Atrial fibrillation with rapid ventricular response.    Patient has moderate to severe tricuspid insufficiency.  EF of 55 to 60%.  Does appear volume overloaded with a dilated IVC.  Troponin negative.  Coronavirus negative.  Patient has been chronically anticoagulated on Xarelto.  He underwent cardioversion last night with return to sinus rhythm briefly.  Has now been in atrial fibrillation but rate controlled.  Continues to remain hypotensive despite cardioversion.  Remains on Levophed.  Question of cellulitis of the bilateral lower extremities.  He has been afebrile white blood cell count has been within normal range.  Suspect that this is more likely due to heart failure and fluid rather than infection.  Lactate has been normal.  Do not think that this represents septic shock.  -Continue treatment for cellulitis with Ancef  -Continue Levophed  -Continue ICU, telemetry  -Continue to diurese with Lasix  -Reduce home Toprol dose from 200 mg daily to 50 mg daily given hypotension.  Consider alternative medication such as amiodarone if remains hypotensive and not rate controlled  -check TSH  -low sodium diet      Skin infection. Cellulitis B/L LE. There is some surrounding erythema of the bilateral lower legs but this also appears to be more chronic.  However, given change in hospitalization  we will continue with antibiotic therapy at this time until blood cultures are negative.  -Continue Ancef    DVT Prophylaxis: On chronic anticoagulation with Xarelto.  Code Status: Full Code discussed with his son.  Confirmed full code.    Karen Collins    Primary Care Physician   Moy Grossman    Chief Complaint   Leg swelling    History is obtained from  the patient and chart review.    History of Present Illness   Tim Gallardo is a 89 year old male who presents with leg swelling.  He presented to [place] emergency room for above complaints.  There was no beds available and he was sent over for further treatment.  Upon arrival to our hospital he was noted to be slightly tachycardic in the 106 range with soft blood pressures 96/50.  He appeared to be volume overloaded on exam but blood pressures would not tolerate diuretic.  He continued to remain hypotensive despite IV treatment which I did not want to continue given his acute volume status.  Heart rate and rhythm consistent with atrial fibrillation.  Patient was brought to the intensive care unit.  He underwent cardioversion.  He has been chronically anticoagulated appropriately.  He briefly went into normal sinus rhythm and then went back into atrial fibrillation but has been rate controlled with a heart rate in the 60-80 range all night.  He tolerated the procedure well without any complications.  Home dosing of Toprol was noted to be 200 mg daily.  He tells me his blood pressures normally run low at home.  I will decrease his dose.  He may require alternative therapy if he remains hypotensive despite treatment.  Echocardiogram did not show significant systolic dysfunction with an EF of 55 but did show severe tricuspid insufficiency.  He was started on diuretic therapy with Lasix overnight and is currently diuresing well.  He has not had any shortness of breath or chest pain during these episodes.    Has had leg swelling and redness for several days or longer.  He lives alone but family checks on him.  Has not had any documented fevers at home.  States that the legs have felt very itchy.    Past Medical History    I have reviewed this patient's medical history and updated it with pertinent information if needed.   No past medical history on file.    Past Surgical History   I have reviewed this patient's  surgical history and updated it with pertinent information if needed.  History reviewed. No pertinent surgical history.    Prior to Admission Medications   Prior to Admission Medications   Prescriptions Last Dose Informant Patient Reported? Taking?   Vitamin D3 (CHOLECALCIFEROL) 125 MCG (5000 UT) tablet 11/8/2020 at AM  Yes Yes   Sig: Take 1 tablet by mouth daily   furosemide (LASIX) 80 MG tablet 11/8/2020 at 1600 Daughter Yes Yes   Sig: Take 80 mg by mouth 2 times daily    levothyroxine (SYNTHROID/LEVOTHROID) 75 MCG tablet 11/8/2020 at AM Daughter Yes Yes   Sig: Take 75 mcg by mouth daily    metoprolol succinate ER (TOPROL-XL) 100 MG 24 hr tablet 11/8/2020 at 1600 Daughter Yes Yes   Sig: Take 200 mg by mouth daily    potassium chloride ER (K-TAB/KLOR-CON) 10 MEQ CR tablet 11/8/2020 at 1600 Daughter Yes Yes   Sig: Take 20 mEq by mouth 2 times daily    pramipexole (MIRAPEX) 0.125 MG tablet 11/8/2020 at 1600 Daughter Yes Yes   Sig: Take 0.125 mg by mouth 2 times daily    primidone (MYSOLINE) 50 MG tablet 11/8/2020 at 1600 Daughter Yes Yes   Sig: Take 25 mg by mouth daily    rivaroxaban ANTICOAGULANT (XARELTO) 15 MG TABS tablet 11/8/2020 at AM Daughter Yes Yes   Sig: TAKE 1 TABLET BY MOUTH ONCE DAILY WITH BREAKFAST.   traMADol (ULTRAM) 50 MG tablet 11/8/2020 at 1600 Daughter Yes Yes   Sig: Take 50 mg by mouth 2 times daily as needed       Facility-Administered Medications: None     Allergies   No Known Allergies    Social History   I have reviewed this patient's social history and updated it with pertinent information if needed. Tim Gallardo      Family History   I have reviewed this patient's family history and updated it with pertinent information if needed.   History reviewed. No pertinent family history.    Review of Systems     REVIEW OF SYSTEMS:    Constitutional: normal energy and appetite, no recent sick contacts  Eyes: no changes in vision  Ears, nose, mouth, throat, and face: no mouth sores, dysphagia,  or odynophagia  Respiratory: no shortness of breath, cough, or wheezing. No aspiration symptoms.   Cardiovascular: no chest pain, palpitations, orthopnea, increased lower extremity edema, or syncope.   Gastrointestinal: no constipation, diarrhea, nausea, vomiting or abdominal pain.  Genitourinary: no dysuria, hematuria, urgency or frequency.   Hematologic/lymphatic: no unintentional weight loss or night sweats.  Musculoskeletal: Leg swelling and redness.  Neurological: no new weakness, tingling, numbness.   Psychiatric: no hallucinations ordelusions.  Endocrine:  not a known diabetic.     Additions to the above include: See HPI.    Physical Exam   Temp: 96.7  F (35.9  C) Temp src: Tympanic BP: 107/54 Pulse: 80   Resp: 12 SpO2: 100 % O2 Device: None (Room air)    Vital Signs with Ranges  Temp:  [96.4  F (35.8  C)-98.9  F (37.2  C)] 96.7  F (35.9  C)  Pulse:  [] 80  Resp:  [11-42] 12  BP: ()/() 107/54  SpO2:  [85 %-100 %] 100 %  239 lbs 6.71 oz    Constitutional: Awake, alert and oriented.  Appears his stated age.  Frail and chronically ill-appearing.  Eyes: Extraocular muscles intact.  Pupils reactive.  Lids normal.  HEENT: Moist mucous membranes.  Respiratory: Clear to auscultation bilaterally.  No wheezing, rhonchi, rales.  Cardiovascular: Irregular.  Significant bilateral lower extremity edema to the thighs.  GI: Abdomen is soft, nontender, nondistended.  Skin: There is weeping and chronic venous stasis changes of the bilateral lower extremities.  There is hyperemia versus erythema.  Significant edema.  Area is not tender to palpation.  There are scratch marks present without open wounds or drainage.  Musculoskeletal: Is able to move independently  Psychiatric: Appropriate affect    Data   Data reviewed today:  I personally reviewed the EKG tracing showing Atrial fibrillation.  Recent Labs   Lab 11/09/20  0515   WBC 10.2   HGB 11.1*   MCV 96      INR 1.98*      POTASSIUM 4.2    CHLORIDE 101   CO2 27   BUN 40*   CR 1.83*   ANIONGAP 11   MARIANA 8.4*   *       Recent Results (from the past 24 hour(s))   Echocardiogram Complete    Narrative    [id number]  [id number]  [id number]  [id number]^JOEL^ZOHREH^M        [facility]  [address] [place] [address]     Name: JUAN HAM  MRN: [id number]  : 1929  Study Date: 2020 08:35 AM  Age: 89 yrs  Gender: Male  Patient Location: [facility]  Reason For Study: Atrial Fibrillation  Ordering Physician: ZOHREH MALDONADO  Performed By: Nasrin Oliveira RDCS, RVT     BSA: 2.3 m2  Height: 72 in  Weight: 239 lb  HR: 82  BP: 109/64 mmHg  _____________________________________________________________________________  __        Procedure  Complete Portable Echo Adult.  _____________________________________________________________________________  __        Interpretation Summary  Left ventricular size is normal.  The Ejection Fraction is estimated at 55-60%.  Right ventricular function, chamber size, wall motion, and thickness are  normal.  Both atria appear normal.  Moderate to severe tricuspid insufficiency is present.  Right ventricular systolic pressure is 60mmHg above the right atrial pressure.  IVC diameter >2.1 cm collapsing <50% with sniff suggests a high RA pressure  estimated at 15 mmHg or greater.  No pericardial effusion is present.  There is no prior study for direct comparison.  _____________________________________________________________________________  __        Left Ventricle  Left ventricular size is normal. Left ventricular wall thickness is normal.  The Ejection Fraction is estimated at 55-60%. Diastolic function not assessed  due to atrial fibrillation. Inferior wall hypokinesis is present.     Right Ventricle  Right ventricular function, chamber size, wall motion, and thickness are  normal.     Atria  Both atria appear normal. The atrial septum is intact as assessed by color  Doppler .     Mitral  Valve  The mitral valve is normal. Trace to mild mitral insufficiency is present.     Aortic Valve  Aortic valve is normal in structure and function.        Tricuspid Valve  Moderate to severe tricuspid insufficiency is present. Right ventricular  systolic pressure is 60mmHg above the right atrial pressure.     Pulmonic Valve  The pulmonic valve is normal.     Vessels  The pulmonary artery and bifurcation cannot be assessed. Ascending aorta 3.7  cm. Sinuses of Valsalva 3.8 cm. IVC diameter >2.1 cm collapsing <50% with  sniff suggests a high RA pressure estimated at 15 mmHg or greater.     Pericardium  No pericardial effusion is present.     Compared to Previous Study  There is no prior study for direct comparison.     _____________________________________________________________________________  __  MMode/2D Measurements & Calculations  IVSd: 0.77 cm  LVIDd: 4.8 cm  LVIDs: 3.1 cm  LVPWd: 0.94 cm  FS: 36.3 %     LV mass(C)d: 138.9 grams  LV mass(C)dI: 60.4 grams/m2  Ao root diam: 3.8 cm  asc Aorta Diam: 3.7 cm  LVOT diam: 2.3 cm  LVOT area: 4.2 cm2  LA Volume (BP): 60.3 ml  LA Volume Index (BP): 26.2 ml/m2  RWT: 0.39        Doppler Measurements & Calculations  MV E max sebastian: 142.0 cm/sec  MV A max sebastian: 63.0 cm/sec  MV E/A: 2.3     MV dec slope: 1023 cm/sec2  MV dec time: 0.14 sec  Ao V2 max: 94.8 cm/sec  Ao max P.0 mmHg  Ao V2 mean: 67.0 cm/sec  Ao mean P.0 mmHg  Ao V2 VTI: 20.3 cm  ADDISON(V,D): 2.7 cm2  LV V1 max P.6 mmHg  LV V1 max: 62.4 cm/sec  TR max sebastian: 386.7 cm/sec  TR max P.0 mmHg  AV Sebastian Ratio (DI): 0.66  Medial E/e': 26.6           _____________________________________________________________________________  __           Report approved by: Brenden Puente 2020 11:18 AM

## 2020-11-09 NOTE — PROGRESS NOTES
Patient arrives via bed from [facility].  Report received from ROHIT Moffett.  Patient continues to be in tachycardia,  to 120, atrial fibrillation with RVR.  BP is hypotensive with systolic pressures in the 70's.  MD, anesthesiologist, RT, and RN's in room.  Defib pads placed.

## 2020-11-09 NOTE — PLAN OF CARE
Direct admit from [place]- report from RNrajwinder.  Arrived via stretcher- slip transfer into bed.  Is alert, has confusion/forgetfulness, pleasant.  Communicates needs, wears glasses.  Denied pain.      Legs edema/weeping with small scattered blisters- abd pads and wrapped with kerlix (uses ace wraps during the day).  Peripheral pulses palpable/weak, denies numbness/tingling.  Lungs crackles, heart irregular/tachy, bowel sounds active, skin reddened with scattered bruising and scratch marks/blisters.      Dr Collins updated on patient low blood pressures and elevated heart rate- lasix held, administered lopressor 5mg iv, and transferred to icu.  Called patients son to update on status.  Betina Kebede RN on 11/9/2020 at 2:30 AM

## 2020-11-09 NOTE — ANESTHESIA POSTPROCEDURE EVALUATION
Patient: Tim Gallardo    * No procedures listed *    Diagnosis:* No pre-op diagnosis entered *  Diagnosis Additional Information: No value filed.    Anesthesia Type:  MAC    Note:  Anesthesia Post Evaluation    Patient location during evaluation: ICU  Patient participation: Able to fully participate in evaluation  Level of consciousness: awake  Pain management: adequate  Airway patency: patent  Cardiovascular status: blood pressure returned to baseline, bradycardic and hypotensive (BP treated with phenylephrine until awake)  Respiratory status: acceptable  Hydration status: acceptable  PONV: none     Anesthetic complications: None    Comments: Care transferred with attending MD/        Last vitals:  Vitals:    11/08/20 2300 11/08/20 2311 11/09/20 0106   BP: 95/61  96/56   Pulse: 108 128 122   Resp: 16     Temp: 98.2  F (36.8  C)     SpO2:            Electronically Signed By: LUIGI Overton CRNA  November 9, 2020  1:55 AM

## 2020-11-09 NOTE — PROGRESS NOTES
:      will continue to follow for discharge planning needs.    SHIVAM Gould on 11/9/2020 at 11:55 AM

## 2020-11-09 NOTE — PROGRESS NOTES
Prior to the start of the procedure and with procedural staff participation, I verbally confirmed the patient s identity using two indicators, relevant allergies, that the procedure was appropriate and matched the consent or emergent situation, and that the correct equipment/implants were available. Immediately prior to starting the procedure I conducted the Time Out with the procedural staff and re-confirmed the patient s name, procedure, and site/side. (The Joint Commission universal protocol was followed.)  Yes

## 2020-11-09 NOTE — PHARMACY-ADMISSION MEDICATION HISTORY
Pharmacy -- Admission Medication Reconciliation    Prior to admission (PTA) medications were reviewed and the patient's PTA medication list was updated.    Sources Consulted: Patient's daughter (Melissa Jurado [phone] (cell); [phone] (home)), [facility] pharmacy (no response received)  [facility] pharmacy ([place]) (no response received)    The reliability of this Medication Reconciliation is: Reliability: Reliable    The following significant changes were made:  -Added patient's preferred pharmacy, per daughter  -Updated last home administration times  -Added Vitamin D  -Competed directions for Furosemide  -Completed directions for Levothyroxine  -Completed directions for Metoprolol  -Completed directions for Pramipexole (patient taking BID, per daughter)  -Completed directions for Potassium, added note (patient takes two 10 mEq tabs BID, as he has difficulty swallowing 20 mEq tabs)  -Completed directions for Primidone  -Completed directions for Tramadol  -REMOVED Aspirin  -Removed B complex  -Removed diphenhydramine  -Removed Furosemide 40 mg entry (patient taking 80 mg BID)  -Removed Flaxseed  -Removed Multivitamin  -Removed Omega-3  -Removed Saw palmetto  -Removed Zolpidem    In addition, the patient's allergies were reviewed with patient's daughter and left as follows:   Allergies: Patient has no known allergies.     Medication barriers identified: none noted- patient's daughter helps set up and administer meds   Medication adherence concerns: none noted   Understanding of emergency medications: N/A    David Ozuna RPH, 11/9/2020,  9:29 AM

## 2020-11-10 PROBLEM — I95.9 HYPOTENSION: Status: ACTIVE | Noted: 2020-01-01

## 2020-11-10 NOTE — PROGRESS NOTES
Called [facility], verified that blood cultures were drawn on 11/8/20 at 1700.  They are currently in process, no final results available at this time.

## 2020-11-10 NOTE — PROGRESS NOTES
Experiences urgency and frequency to void, occasionally incontinent of urine. Brief on. Voids sitting or standing at bedside with urinal. Experiences some weakness while standing. Went for a walk with PT without weakness, dizziness or SOB. Right internal jugular central line placed by Dr. Juarez. Levophed off at 1340 with pressures 100s systolic with MAP greater than 65. LS clear bilaterally. Edema bilaterally in feet and ankles. Shins dressed x 2 with Telfa and Kerlix after cleaning with saline.  WOUNDS:  1)  Mid-upper shin: 1cm by 2 cm with granulation tissue.  (2) Mid-shin:  1.5cm x 1 cm with 75% yellow slough with granulation tissue;  Upper portion of wound is 2cm x 1 cm of yellow slough. LE warm to touch, redness at baseline, non-odorous, yellow/serous fluid mixed with yellow, thick drainage.

## 2020-11-10 NOTE — PROCEDURES
CENTRALVENOUS LINE INSERTION PROCEDURE NOTE    Indication: shock and ongoing levophed need    Location: right IJ    Anesthesia: 1% lidocaine    The procedure, benefits, risks, and alternatives were explained to the patient. He voiced understanding of the information and agreed to proceed with the procedure.    The skin overlying the right IJ was prepped and draped in normal sterile manner. A vascular ultrasound device was used to locate the vein Internal Jugular. A finder needle was used to enter the vein, through which a guidewire was inserted. The finder needle was then removed and the tract was dilated. The triple lumen central venous catheter was inserted over the guidewire without difficulty. The guidewire was removed and the catheter was sutured to the overlying skin. The patient tolerated the procedure well and there were no immediate complications. A post-procedure chest radiograph is pending.      Jaspreet Juarez MD

## 2020-11-10 NOTE — PROGRESS NOTES
Pt c/o leg pain - given medication intervention - effective. Leg dressing changed. Bed bath given for irritated skin after little relief with use of hydroxyzine. Currently Levophed drip running at 0.08 mcg/kg/hr. Needing 2L O2 NC during sleep for O2 sat >90% Frequent urination/urgency .will cont to monitor.

## 2020-11-10 NOTE — PROGRESS NOTES
Patient has been alert, oriented, pleasant.  IV Levophed continues at 0.08mcg/h.  109/92 MAP of 98.  When lessened, pressures drop into the 80's and his Map is below 60.  He has no s/sx however with the low pressures.  Afebrile.  He complains of overall discomfort, but states that he would not call it pain.  He is restless in bed, has furrowed facial expressions, frequently pulling at his legs.  Gave tylenol, he appears more comfortable and is now resting.  With the IV lasix, he is up to void almost hourly, anywhere from 100-200cc at a time; does not want to have a camacho placed.  His legs are still extremely edematous, skin is veronika in color, open areas are weeping, but they have improved over the past two days.  Skin is otherwise intact with lots of bruising noted to bilateral arms.  He has been needing a little help to get up to stand at the edge of the bed, but is otherwise able to stand with minimal assist and pivot transfer.  Mostly continent, but with his urgency sometimes cannot always make it to use the urinal.  Ate breakfast well.  Continuing to monitor BP, pulse, edema, and s/sx of infection.

## 2020-11-10 NOTE — PROGRESS NOTES
11/10/20 1500   Quick Adds   Type of Visit Initial Occupational Therapy Evaluation   Living Environment   People in home alone   Current Living Arrangements house   Home Accessibility no concerns   Transportation Anticipated family or friend will provide   Self-Care   Usual Activity Tolerance moderate   Current Activity Tolerance fair   Disability/Function   Hearing Difficulty or Deaf no   Wear Glasses or Blind yes   Concentrating, Remembering or Making Decisions Difficulty no   Difficulty Communicating no   Difficulty Eating/Swallowing no   Walking or Climbing Stairs Difficulty yes   Toileting no   Fall history within last six months no   General Information   General Observations and Info Pt is  89 y.o. male who resides [place], with a son who resides on one side, and a dtr who resides on other side of his home. He managed his own self cares and heating meals, has family support and help him as needed.   Cognitive Status Examination   Orientation Status orientation to person, place and time   Follows Commands WNL   Cognitive Status Comments pt appears tohave good safety awareness   Integumentary/Edema   Integumentary/Edema other (describe)   Integumentary/Edema Comments edematous feet make ambulation uncomfortable  (BLE edema)   Coordination   Coordination Comments no concerns   Transfers   Transfers sit-stand transfer   Transfer Comments min assist   Activities of Daily Living   BADL Assessment/Intervention upper body dressing;grooming;bathing   Bathing Assessment/Intervention   [place] Level (Bathing) moderate assist (50% patient effort)   Upper Body Dressing Assessment/Training   [place] Level (Upper Body Dressing) moderate assist (50% patient effort)   Grooming Assessment/Training   [place] Level (Grooming) set up;supervision   Instrumental Activities of Daily Living (IADL)   Previous Responsibilities meal prep   Clinical Impression   Criteria for Skilled Therapeutic Interventions Met (OT) yes    OT Diagnosis decline in ADL function   OT Problem List-Impairments impacting ADL strength;mobility;problems related to  (LE edema)   Assessment of Occupational Performance 1-3 Performance Deficits   Identified Performance Deficits self cares and functional mobility   Planned Therapy Interventions (OT) ADL retraining;progressive activity/exercise   Clinical Decision Making Complexity (OT) low complexity   Therapy Frequency (OT) Daily   Predicted Duration of Therapy 1-3 days   Anticipated Equipment Needs Upon Discharge (OT) other (see comments)  (not assessed)   Risks and Benefits of Treatment have been explained. Yes   Patient, Family & other staff in agreement with plan of care Yes   Comment-Clinical Impression Pt is cooperative adn motivated to improve ADL function   OT Discharge Planning    OT Discharge Recommendation (DC Rec) Transitional Care Facility   OT Rationale for DC Rec pt is weakened and has LE edema affecting safety for ADL function ad mobility   OT Brief overview of current status  Pt would benefit from OT to improve strength and management of self cares and mobility   Total Evaluation Time (Minutes)   Total Evaluation Time (Minutes) 15

## 2020-11-10 NOTE — PROGRESS NOTES
Daughter Sarita called regarding central line placement. Educated on risks and benefits and she agreed that the central line should be placed.

## 2020-11-10 NOTE — PROGRESS NOTES
Tele health contacted as Pt c/o uncontrollable itching. Pt given hydroxyzine, bad bath, change of sheets, lotion with minimal benefit. Pt will not stop scratching skin leading to scratch marks and bleeding, notably on hips, back and arms.

## 2020-11-10 NOTE — PROGRESS NOTES
MD in room to place Central Line.  Risks vs benefits explained to patient.  Consent signed by patient.  Procedure started.

## 2020-11-10 NOTE — PROGRESS NOTES
11/10/20 1600   Signing Clinician's Name / Credentials   Signing clinician's name / credentials Edwina Chang OTR/L   Quick Adds   Rehab Discipline OT   Functional Transfer Training   Treatment Detail functional transfers and mobility using FWW and min assist   Therapeutic Activity   Treatment Detail supportive positioning in recliner with pillows and towel rolls to support BLEs and BUEs, p treported improved comfort   PT Discharge Planning    PT Discharge Recommendation (DC Rec) Transitional Care Facility   PT Rationale for DC Rec pt resides alone - he would benefit from ADL training to include safety and AE/DME needs assessment prior to return home   PT Brief overview of current status  pt requiring min to mod assist for ADL, min assist for functional mob during ADL   Additional Documentation   OT Plan continue OT   Total Session Time   Total Session Time (minutes) 45 minutes

## 2020-11-10 NOTE — PROGRESS NOTES
11/10/20 1400   Quick Adds   Type of Visit Initial PT Evaluation   Living Environment   People in home alone   Current Living Arrangements house   Home Accessibility no concerns   Transportation Anticipated family or friend will provide   Self-Care   Usual Activity Tolerance moderate   Current Activity Tolerance fair   Regular Exercise No   Equipment Currently Used at Home walker, rolling   Disability/Function   Hearing Difficulty or Deaf no   Wear Glasses or Blind yes   Concentrating, Remembering or Making Decisions Difficulty no   Difficulty Communicating no   Difficulty Eating/Swallowing no   Walking or Climbing Stairs Difficulty yes   Walking or Climbing Stairs ambulation difficulty, requires equipment;ambulation difficulty, assistance 1 person   Toileting no   Fall history within last six months no   General Information   Referring Physician Chapis   Patient/Family Therapy Goals Statement (PT) return home if possible   Weight-Bearing Status - LLE full weight-bearing   Weight-Bearing Status - RLE full weight-bearing   Cognition   Orientation Status (Cognition) oriented x 4   Affect/Mental Status (Cognition) WFL   Follows Commands (Cognition) WFL   Pain Assessment   Patient Currently in Pain Yes, see Vital Sign flowsheet   Integumentary/Edema   Integumentary/Edema Comments increased lower leg edema   Posture    Posture Forward head position;Protracted shoulders   Range of Motion (ROM)   ROM Quick Adds ROM WFL   Strength   Manual Muscle Testing Quick Adds Strength WFL   Strength Comments however, patient fatigues with activity   Bed Mobility   Bed Mobility supine-sit;sit-supine   Supine-Sit [place] (Bed Mobility) supervision   Sit-Supine [place] (Bed Mobility) supervision   Transfers   Transfers bed-chair transfer;sit-stand transfer   Impairments Contributing to Impaired Transfers decreased strength   Bed-Chair Transfer   Bed-Chair [place] (Transfers) minimum assist (75% patient effort)    Assistive Device (Bed-Chair Transfers) walker, front-wheeled   Sit-Stand Transfer   Sit-Stand [place] (Transfers) minimum assist (75% patient effort)   Assistive Device (Sit-Stand Transfers) walker, front-wheeled   Gait/Stairs (Locomotion)   Assistive Device (Gait) walker, front-wheeled   Pattern (Gait) 3-point   Balance   Balance Comments fair with Fww   Sensory Examination   Sensory Perception Comments appears intact to light touch in LEs   Coordination   Coordination no deficits were identified   Muscle Tone   Muscle Tone no deficits were identified   Clinical Impression   Criteria for Skilled Therapeutic Intervention yes, treatment indicated   PT Diagnosis (PT) impaired mobility   Influenced by the following impairments weakness, fatigue, foot pain   Functional limitations due to impairments activity/gait tolerance   Clinical Presentation Stable/Uncomplicated   Clinical Decision Making (Complexity) low complexity   Therapy Frequency (PT) Daily   Predicted Duration of Therapy Intervention (days/wks) 2-3 days   Planned Therapy Interventions (PT) gait training;transfer training   Anticipated Equipment Needs at Discharge (PT) walker, rolling   Risk & Benefits of therapy have been explained risks/benefits reviewed;patient   PT Discharge Planning    PT Discharge Recommendation (DC Rec) Transitional Care Facility   PT Rationale for DC Rec to promote strength, stabiltiy and safe mobility   PT Brief overview of current status  SBA for bed mobilities; transfers and ambulation with Fww and minimal assist to CGA   Total Evaluation Time   Total Evaluation Time (Minutes) 15

## 2020-11-10 NOTE — PROGRESS NOTES
Pt reports being restless/anxious. Tele Health contacted. Nurse will consult Tele Health MD and call back.

## 2020-11-10 NOTE — PROGRESS NOTES
[facility]    Medicine Progress Note - Hospitalist Service       Date of Admission:  11/8/2020  Assessment & Plan       Atrial fibrillation with rapid ventricular response.    Patient has moderate to severe tricuspid insufficiency.  EF of 55 to 60%.  Does appear volume overloaded with a dilated IVC.  Troponin negative.  Coronavirus negative.  Presented to the ED with increased leg swelling noted to be in A. fib with RVR.  Was admitted to the ICU and needed cardioversion.  Continues to be in chronic A. fib, rate controlled although continues to have low blood pressures requiring pressor support with Levophed.  Echocardiogram with normal EF, so because of hypotension not clear although could be related to infectious etiology.  At this point will continue to Levophed, wean as able.  Diuresing gently maintaining blood pressure and watching renal function.         Skin infection. Cellulitis B/L LE. There is some surrounding erythema of the bilateral lower legs but this also appears to be more chronic.  However, given change in hospitalization  we will continue with antibiotic therapy at this time until blood cultures are negative.  Procalcitonin elevated  -Continue Ancef     DVT Prophylaxis: On chronic anticoagulation with Xarelto.  Code Status: Full Code  previously discussed with the son. Confirmed full code.       Diet: 2 Gram Sodium Diet    DVT Prophylaxis: Taking Xarelto  Contreras Catheter: not present  Code Status: Full Code           Disposition Plan   Expected discharge: 2 - 3 days, recommended to prior living arrangement once antibiotic plan established, mental status at baseline and Improved swelling, heart rate controlled, not requiring oxygen supplementation.  Entered: Nav Nation MD 11/10/2020, 9:39 AM       The patient's care was discussed with the Bedside Nurse and Patient.    Nav Nation MD  Hospitalist Service  [facility]  Contact information  available via [facility] Paging/Directory    ______________________________________________________________________    Interval History   Stable overnight, nursing noting some irregular rapid heart rhythms which he remains asymptomatic.  Continues on Levophed infusion, attempt to wean because his MAP is to drop less than 65.  Nursing reports legs are less swollen, continue to drain clear fluid.  He denies difficulty breathing, pain in the chest, abdominal pain, pain in the legs.  Appetite has been fair.    Data reviewed today: I reviewed all medications, new labs and imaging results over the last 24 hours. I personally reviewed no images or EKG's today.    Physical Exam   Vital Signs: Temp: (P) 96.4  F (35.8  C) Temp src: (P) Tympanic BP: 97/75 Pulse: 60   Resp: 13 SpO2: 90 %      Weight: 236 lbs 5.33 oz  Constitutional: Alert, sitting up in bed in no obvious distress  Respiratory: Lungs are clear, no wheezing or rales heard  Cardiovascular: Irregular, regular rate and rhythm.  He does have peripheral edema in both legs with the legs wrapped  GI: Soft, nontender, nondistended  Skin: Legs wrapped with Kerlix.  Nursing notes continue to have some clear drainage, redness and swelling appears improved    Data   Recent Labs   Lab 11/10/20  0500 11/09/20  0515   WBC 10.9 10.2   HGB 10.6* 11.1*   MCV 95 96    227   INR 1.90* 1.98*    139   POTASSIUM 3.1* 4.2   CHLORIDE 102 101   CO2 27 27   BUN 37* 40*   CR 1.65* 1.83*   ANIONGAP 10 11   MARIANA 8.1* 8.4*   * 135*   ALBUMIN 3.2*  --    PROTTOTAL 5.7*  --    BILITOTAL 0.6  --    ALKPHOS 51  --    ALT 6*  --    AST 20  --

## 2020-11-10 NOTE — PROGRESS NOTES
Call received from radiology and discussed PICC line placement versus central line placement.  Patient is require several lines for IV access at this time, thus MD will attempt to place a central line.  Spoke with patient, he is unsure of it and wants to have his daughter called for consent.

## 2020-11-10 NOTE — PROGRESS NOTES
:    I received a call from patients daughter Melissa stating she would like patient to be placed in an SNF for rehab and then they will decide if maybe an assisted living at some point.  I gave her SNF options and she chose [facility] in [place]. I made referral to [facility]. I received a call from SHIVAM Bland at [facility] in [place].  She stated that Sarita has reached out to her as patient usually doctors with [facility].   Edwina stated they would screen patient for possible admission. Daughter was updated.   will continue to follow.    SHIVAM Gould on 11/10/2020 at 11:48 AM

## 2020-11-10 NOTE — PROGRESS NOTES
11/10/20 1500   Signing Clinician's Name / Credentials   Signing clinician's name / credentials Markel Fan MPT   Quick Adds   Rehab Discipline PT   Quick Adds Mobility;Therapeutic Activity   Functional Transfer Training   Treatment Detail minimal assist to CGA with Fww for transfers   Gait Training   Symptoms Noted During/After Treatment (Gait Training) fatigue;increased pain   Distance in Feet (Required for LE Total Joints) 120   Treatment Detail ambulation in hallway   [place] Level (Gait Training) contact guard   Weight Bearing (Gait Training) full weight-bearing   Assistive Device (Gait Training) rolling walker   Gait Analysis Deviations decreased bill   Therapeutic Activity   Treatment Detail bed mobilities with SBA   PT Discharge Planning    PT Discharge Recommendation (DC Rec) Transitional Care Facility   PT Rationale for DC Rec to promote strength, stability and safe mobility   PT Brief overview of current status  SBA for bed mobilities; transfers and ambulation with Fww and minimal assist to CGA   Additional Documentation   Rehab Comments fair mobility using a Fww; he will benefit from continued PT at short term rehab   PT Plan continue PT   Total Session Time   Total Session Time (minutes) 45 minutes

## 2020-11-10 NOTE — PROGRESS NOTES
Blood pressure confirmation checks without Levofed:  Left Bicep: 83/49 MAP 55  Right Bicep: 92/57 MAP 67  Left Forearm: 105/63 MAP 75

## 2020-11-11 PROBLEM — I36.1 NONRHEUMATIC TRICUSPID VALVE REGURGITATION: Status: ACTIVE | Noted: 2020-01-01

## 2020-11-11 NOTE — PROGRESS NOTES
11/11/20 1500   Signing Clinician's Name / Credentials   Signing clinician's name / credentials Markel Fan MPT   Quick Adds   Rehab Discipline PT   Functional Transfer Training   Treatment Detail minimal assist for sit to stand; CGA with Fww for stand pivot transfers   Gait Training   Symptoms Noted During/After Treatment (Gait Training) fatigue;increased pain   Distance in Feet (Required for LE Total Joints) 30   Treatment Detail ambulation with patient's room   [place] Level (Gait Training) contact guard   Physical Assistance Level (Gait Training) 1 person assist   Weight Bearing (Gait Training) full weight-bearing   Assistive Device (Gait Training) rolling walker   Gait Analysis Deviations decreased bill   PT Discharge Planning    PT Discharge Recommendation (DC Rec) Transitional Care Facility   PT Rationale for DC Rec to promote strength, stability and safe mobility   PT Brief overview of current status  SBA for bed mobilities; minimal assist for sit to stand; CGA for pivot transfers and ambulation with Fww   Additional Documentation   PT Plan continue PT   Total Session Time   Total Session Time (minutes) 25 minutes

## 2020-11-11 NOTE — ASSESSMENT & PLAN NOTE
Mostly right-sided with normal left-sided EF  Combination of atrial fibrillation as well as moderate to severe tricuspid insufficiency  Improving, incision oral Lasix with continued diuresis, will need outpatient management with determination of dry weight and possible taper of his Lasix

## 2020-11-11 NOTE — PROGRESS NOTES
:    I received a call from Edwina at [facility] in [place] and they cannot accept her at this time.  Rose stated she knows this family and has reached out to ROHIT Cervantes at [facility] and they have one room open for him.  I called daughter Melissa and she gave me update and confirmed that [facility] is saving a room for patient.  I made referral to ROHIT Cervantes at [facility] and left message for her.  Daughter is ordering him a hospital bed through [facility].  Anticipate discharge in 1-2 days.    SHIVAM Gould on 11/11/2020 at 1:29 PM

## 2020-11-11 NOTE — PROGRESS NOTES
Pt bath and wound care preformed. Pt tolerated well . Pt is up in recliner with BLE elevated . Pt legs cleansed , Vaseline applied to skin non adherent gauze then kerlix wrap and tubi  dressing applied to help and maintain dressing . Pt bed linens changed. Pt is A & O x 4 with times of confusion noted. call light in reach . Fall precautions in place.

## 2020-11-11 NOTE — PROGRESS NOTES
Pt noted to have brief episode per tele monitor of tachycardia with max rate noted 160 Pt resting quietly in bed arouses easily. Pt asymptomatic.

## 2020-11-11 NOTE — ASSESSMENT & PLAN NOTE
secondary to venous stasis secondary to right-sided heart failure  Improving at discharge, continue diuresis

## 2020-11-11 NOTE — ASSESSMENT & PLAN NOTE
Hypotensive after conversion with rate control atrial fibrillation, recurring Levophed initiated  Hypotension likely due to underlying infection, maybe sepsis.   Cardiac function with normal EF  Was weaned off the Levophed, pressures are good and diuresing better  Blood pressures have been stable at discharge, will need monitoring at discharge and lasix dose adjusted as he reaches his dry weight

## 2020-11-11 NOTE — ASSESSMENT & PLAN NOTE
Echocardiogram showing normal EF but has moderate to severe tricuspid regurgitation with probably some right-sided failure  Diuresed with IV Lasix, transition oral Lasix which be continued at discharge

## 2020-11-11 NOTE — PHARMACY
Pharmacy - Transfer Medication Reconciliation     The patient's transfer medication orders have been compared to the medication administration record and to the Prior to Admissions Medications list - any noted discrepancies were resolved with the MD.     Thank you. Pharmacy will continue to monitor.     Rg Mendez, Pharmacy Intern on 11/11/2020 at 3:22 PM

## 2020-11-11 NOTE — ASSESSMENT & PLAN NOTE
Presented to hospital with leg swelling and redness  Certainly a large component of stasis dermatitis but there is some secondary infection as well  Treated with IV ancef, transitioned prior to discharge to augmentin  Diuresed with IV Lasix with improvement of swelling, redness improving as well, having less pain  At discharge, much less swollen with drying lesions  Will be discharged to home for 2 days and will be accepted to Assisted living on 11/15/2020 for ongoing cares  Continue oral augmentin for 5 more days and continue oral lasix with OP follow-up

## 2020-11-11 NOTE — PROGRESS NOTES
Pt transferred to [facility] from ICU at 1517. VSS. Pt ambulated to chair with walker and assist x2. Leigha Strong RN on 11/11/2020 at 3:54 PM

## 2020-11-12 NOTE — PLAN OF CARE
Discharge Planner PT    Patient approached for ambulation on two occasions; patient very fatigued with nursing staff requesting that he be allowed to sleep. Nursing staff to ambulate patient this evening per their report.       Entered by: Allen Fan 11/12/2020 4:04 PM

## 2020-11-12 NOTE — PLAN OF CARE
Tim Gallardo is here for concerns of skin infection.     Rates pain 2/10 generalized ache- prn acetaminophen administered. Lung sounds clear. Bowel sounds active. Heart rate regular. One assist with walker. 2mg Na diet. Dressings changed to BLE. Legs elevated in bed    Will continue to monitor.   /64 (BP Location: Right arm)   Pulse 76   Temp 98.4  F (36.9  C) (Tympanic)   Resp 18   Ht 1.829 m (6')   Wt 105.9 kg (233 lb 7.5 oz)   SpO2 95%   BMI 31.66 kg/m      Leigha Noble RN 11/12/20 4:38 PM

## 2020-11-12 NOTE — PROGRESS NOTES
11/12/20 1600   Signing Clinician's Name / Credentials   Signing clinician's name / credentials Markel Fan MPT   Quick Adds   Rehab Discipline PT   Functional Transfer Training   Treatment Detail minimal assist to CGA with Fww   Gait Training   Symptoms Noted During/After Treatment (Gait Training) fatigue   Distance in Feet (Required for LE Total Joints) 20   Treatment Detail ambulation with patient's room   [place] Level (Gait Training) contact guard   Physical Assistance Level (Gait Training) 1 person assist   Weight Bearing (Gait Training) full weight-bearing   Assistive Device (Gait Training) rolling walker   Gait Analysis Deviations decreased bill   PT Discharge Planning    PT Discharge Recommendation (DC Rec) Transitional Care Facility   PT Rationale for DC Rec to promote strength, stability and safe mobility   PT Brief overview of current status  SBA for bed mobilities; minimal assist for sit to stand; CGA for pivot transfers and ambulation with Fww   Additional Documentation   Rehab Comments fair mobility using a Fww; he will benefit from continued PT at short term rehab or [facility]   PT Plan continue PT   Total Session Time   Total Session Time (minutes) 15 minutes

## 2020-11-12 NOTE — PLAN OF CARE
Problem: Adult Inpatient Plan of Care  Goal: Plan of Care Review  Outcome: Improving  Goal: Patient-Specific Goal (Individualized)  Outcome: Improving  Goal: Absence of Hospital-Acquired Illness or Injury  Outcome: Improving  Goal: Optimal Comfort and Wellbeing  Outcome: Improving  Goal: Readiness for Transition of Care  Outcome: Improving     Problem: Fluid Volume Excess  Goal: Fluid Balance  Outcome: Improving     Problem: Pain Acute  Goal: Acceptable Pain Control and Functional Ability  Outcome: Improving

## 2020-11-12 NOTE — PROGRESS NOTES
Central line removed per order. Catheter tip intact. Pt tolerated.Leigha Strong RN on 11/12/2020 at 3:22 PM

## 2020-11-12 NOTE — PROGRESS NOTES
[facility]    Medicine Progress Note - Hospitalist Service       Date of Admission:  11/8/2020  Assessment & Plan       Skin infection  Presented to hospital with leg swelling and redness  Certainly a large component of stasis dermatitis but there is some secondary infection as well  Diuresing with IV Lasix with improvement of swelling, redness improving as well, having less pain  Today will move to oral Augmentin.  Continue to monitor  Likely to TCU tomorrow    Hypotension  Hypotensive after conversion with rate control atrial fibrillation, recurring Levophed yesterday  Has been weaned off the Levophed, pressures are good and diuresing better  Blood pressures have been stable    Nonrheumatic tricuspid valve regurgitation  Echocardiogram showing normal EF but has moderate to severe tricuspid regurgitation with probably some right-sided failure  Continue diuresis is doing    Heart failure (H)  Mostly right-sided with normal left-sided EF  Combination of atrial fibrillation as well as moderate to severe tricuspid insufficiency  Improving, will transition to oral Lasix, possible discharge to nursing home tomorrow    CKD (chronic kidney disease) stage 3, GFR 30-59 ml/min  Creatinine actually improving with diuresis  Monitoring    Bilateral leg edema   secondary to venous stasis secondary to right-sided heart failure  Continue diuresis    Atrial fibrillation, unspecified type (H)  Currently rate controlled  Continue to monitor           Diet: 2 Gram Sodium Diet    DVT Prophylaxis: Enoxaparin (Lovenox) SQ  Contreras Catheter: not present  Code Status: Full Code           Disposition Plan   Expected discharge: Tomorrow, recommended to transitional care unit once antibiotic plan established, mental status at baseline and Improving heart failure.  Entered: aNv Nation MD 11/12/2020, 1:08 PM       The patient's care was discussed with the Bedside Nurse and Patient.    Nav Nation,  MD  Hospitalist Service  [facility]  Contact information available via [facility] Paging/Directory    ______________________________________________________________________    Interval History   Feeling better overall.  Denies chest pain, shortness of breath.  Not requiring oxygen.  Appetite has been fair.  Nursing notes that legs are less swollen, less weeping    Data reviewed today: I reviewed all medications, new labs and imaging results over the last 24 hours. I personally reviewed no images or EKG's today.    Physical Exam   Vital Signs: Temp: 97.8  F (36.6  C) Temp src: Tympanic BP: 111/50 Pulse: 77   Resp: 20 SpO2: 94 % O2 Device: None (Room air)    Weight: 233 lbs 7.47 oz  Constitutional: awake, alert, cooperative, no apparent distress, and appears stated age  Respiratory: Lungs are clear, no wheezing or rales heard  Cardiovascular: irregularly irregular rhythm  Skin: Stasis changes of lower legs but much less swollen and weeping as compared to yesterday.    Data   Recent Labs   Lab 11/12/20  0530 11/11/20  0536 11/10/20  1846 11/10/20  0500 11/09/20  0515   WBC 8.8 10.6  --  10.9 10.2   HGB 10.6* 11.1*  --  10.6* 11.1*   MCV 96 95  --  95 96    221  --  205 227   INR  --   --   --  1.90* 1.98*    140  --  139 139   POTASSIUM 3.1* 3.6 3.6 3.1* 4.2   CHLORIDE 101 99  --  102 101   CO2 32* 32*  --  27 27   BUN 24 29*  --  37* 40*   CR 1.20 1.31*  --  1.65* 1.83*   ANIONGAP 10 9  --  10 11   MARIANA 8.2* 8.7  --  8.1* 8.4*   * 99  --  137* 135*   ALBUMIN  --  3.4*  --  3.2*  --    PROTTOTAL  --  6.1*  --  5.7*  --    BILITOTAL  --  0.8  --  0.6  --    ALKPHOS  --  53  --  51  --    ALT  --  6*  --  6*  --    AST  --  27  --  20  --

## 2020-11-12 NOTE — PLAN OF CARE
Patient is alert, oriented with confusion.  Is assist x1 with transfers and mobility in room- uses urinal in bed.  Denied pain, no nausea.  Has fine crackles in lungs, maintaining oxygen with no supplemental o2, denies shortness of breath, heart irregular, bowel sounds active, 3+ edema/weeping ble, pedal pulses palpable/weak, skin scratches/bruising/sores/veronika/fragile.  Dressings to ble changed and feet elevated on pillows.  Iv x2 in left arm pulled.  Betina Kebede RN on 11/12/2020 at 1:08 AM

## 2020-11-13 NOTE — DISCHARGE SUMMARY
[facility]  Hospitalist Discharge Summary      Date of Admission:  11/8/2020  Date of Discharge:  11/13/2020  Discharging Provider: Nav Nation MD      Discharge Diagnoses   Principal Problem:    Skin infection/cellulitis  Active Problems:    Hypotension    Atrial fibrillation, unspecified type (H)    Bilateral leg edema    CKD (chronic kidney disease) stage 3, GFR 30-59 ml/min    Heart failure (H)    Nonrheumatic tricuspid valve regurgitation    Possible sepsis        Follow-ups Needed After Discharge   Follow-up Appointments     Follow-up and recommended labs and tests       Follow up with primary care provider, Dr. Beavers, on November 23 to   evaluate medication change and to evaluate after surgery.  The following   labs/tests are recommended: Recheck renal function, creatinine, potassium.               Unresulted Labs Ordered in the Past 30 Days of this Admission     No orders found from 10/9/2020 to 11/9/2020.      These results will be followed up by Dr. Beavers    Discharge Disposition   Discharged to home  Will be admitted to assisted living on November 15  Condition at discharge: Satisfactory      Hospital Course   Skin infection  Presented to hospital with leg swelling and redness  Certainly a large component of stasis dermatitis but there is some secondary infection as well with a cellulitis  Treated with IV ancef, transitioned prior to discharge to augmentin  Diuresed with IV Lasix with improvement of swelling, redness improving as well, having less pain  At discharge, much less swollen with drying lesions, cellulitis had for most part resolved  Will be discharged to home for 2 days and will be accepted to Assisted living on 11/15/2020 for ongoing cares  Continue oral augmentin for 5 more days and continue oral lasix with OP follow-up    Hypotension  Hypotensive after cardioversion with rate controlled atrial fibrillation, Levophed initiated  Hypotension likely due to  underlying infection, possibly sepsis secondary to the underlying celluilitis  Cardiac function with normal EF  Was weaned off the Levophed, pressures are good and diuresing better  Blood pressures have been stable at discharge, will need monitoring at discharge and lasix dose adjusted as he reaches his dry weight    Nonrheumatic tricuspid valve regurgitation  Echocardiogram showing normal EF but has moderate to severe tricuspid regurgitation with probably some right-sided failure  Diuresed with IV Lasix, transition oral Lasix which be continued at discharge    Heart failure (H)  Mostly right-sided with normal left-sided EF  Combination of atrial fibrillation as well as moderate to severe tricuspid insufficiency  Improving, incision oral Lasix with continued diuresis, will need outpatient management with determination of dry weight and possible taper of his Lasix    CKD (chronic kidney disease) stage 3, GFR 30-59 ml/min  Creatinine initially elevated over baseline possibly due to sepsis and/or poor perfusion due to hypotension  Creatinine  improved with diuresis and improvement in blood pressure      Bilateral leg edema   secondary to venous stasis secondary to right-sided heart failure  Improving at discharge, continue diuresis    Atrial fibrillation, unspecified type (H)  Currently rate controlled  Continue to monitor        Consultations This Hospital Stay   PHYSICAL THERAPY ADULT IP CONSULT  OCCUPATIONAL THERAPY ADULT IP CONSULT  SOCIAL WORK IP CONSULT    Code Status   Full Code    Time Spent on this Encounter   I, Nav Nation MD, personally saw the patient today and spent greater than 30 minutes discharging this patient.       Nav Nation MD  [facility]  [address]  [place] [address]  Phone: [phone]  Fax: [phone]  ______________________________________________________________________    Physical Exam   Vital Signs: Temp: 98.6  F (37  C) Temp  src: Tympanic BP: 120/62 Pulse: 89   Resp: 19 SpO2: 92 % O2 Device: None (Room air)    Weight: 229 lbs 9.6 oz  Constitutional: awake, alert, cooperative, no apparent distress, and appears stated age  Respiratory: No increased work of breathing, good air exchange, clear to auscultation bilaterally, no crackles or wheezing  Cardiovascular: irregularly irregular rhythm and peripheral edema, much improved with some stasis changes and drying open wounds  GI: normal bowel sounds, soft, non-distended and non-tender  Skin: Dry and superficial wounds over the lower leg especially over the shin area.  Being dressed with Adaptic and gauze       Primary Care Physician   Moy Grossman    Discharge Orders      Reason for your hospital stay    Admitted with increased swelling of both legs with weeping     Follow-up and recommended labs and tests     Follow up with primary care provider, Dr. Beavers, on November 23 to evaluate medication change and to evaluate after surgery.  The following labs/tests are recommended: Recheck renal function, creatinine, potassium.     Activity    Your activity upon discharge: activity as tolerated     Wound care and dressings    Instructions to care for your wound at home: daily dressing changes to legs, wrap with Kerlix with some Vaseline gauze against the wounds to prevent sticking.     Full Code     Diet    Follow this diet upon discharge: Orders Placed This Encounter      2 Gram Sodium Diet       Significant Results and Procedures   Most Recent 3 CBC's:  Recent Labs   Lab Test 11/12/20  0530 11/11/20  0536 11/10/20  0500   WBC 8.8 10.6 10.9   HGB 10.6* 11.1* 10.6*   MCV 96 95 95    221 205     Most Recent 3 BMP's:  Recent Labs   Lab Test 11/13/20  0437 11/12/20  1305 11/12/20  0530 11/11/20  0536     --  143 140   POTASSIUM 3.2* 3.6 3.1* 3.6   CHLORIDE 103  --  101 99   CO2 30  --  32* 32*   BUN 23  --  24 29*   CR 1.27  --  1.20 1.31*   ANIONGAP 11  --  10 9   MARIANA 8.2*  --  8.2* 8.7      --  120* 99     Most Recent 6 Bacteria Isolates From Any Culture (See EPIC Reports for Culture Details):No lab results found.,   Results for orders placed or performed during the hospital encounter of 20   XR Chest 2 Views    Narrative    PROCEDURE:  XR CHEST 2 VW    HISTORY:  Heart failure.     COMPARISON:  None.    FINDINGS:   The heart is enlarged. Postoperative changes are seen in the  mediastinum. There is mild interstitial thickening seen at the lung  bases. There is subsegmental atelectasis in the left lung base. No  pleural effusion or pneumothorax.      Impression    IMPRESSION:  Mild interstitial thickening at the lung bases. Mild  cardiomegaly.      LEWIS LAWTON MD   XR Chest Port 1 View    Narrative    PROCEDURE:  XR CHEST PORT 1 VW    HISTORY:  Confirm placement of Central Line.     COMPARISON:  November 10, 2020    FINDINGS:   The heart is enlarged. Postoperative changes are seen in the  mediastinum The pulmonary vasculature is normal.  Mild interstitial  thickening is seen at the lung bases. There is a central catheter with  its tip in the superior vena cava. No complication of central catheter  insertion is seen.      Impression    IMPRESSION:  Central catheter with its tip in the superior vena cava      LEWIS LAWTON MD   Echocardiogram Complete    Narrative    [id number]  [id number]  [id number]  [id number]^JOEL^ZOHREH^M        [facility]  [address] [place] [address]     Name: JUAN HAM  MRN: [id number]  : 1929  Study Date: 2020 08:35 AM  Age: 89 yrs  Gender: Male  Patient Location: [facility]  Reason For Study: Atrial Fibrillation  Ordering Physician: ZOHREH MALDONADO  Performed By: Nasrin Oliveira RDCS, RVT     BSA: 2.3 m2  Height: 72 in  Weight: 239 lb  HR: 82  BP: 109/64 mmHg  _____________________________________________________________________________  __        Procedure  Complete Portable Echo  Adult.  _____________________________________________________________________________  __        Interpretation Summary  Left ventricular size is normal.  The Ejection Fraction is estimated at 55-60%.  Right ventricular function, chamber size, wall motion, and thickness are  normal.  Both atria appear normal.  Moderate to severe tricuspid insufficiency is present.  Right ventricular systolic pressure is 60mmHg above the right atrial pressure.  IVC diameter >2.1 cm collapsing <50% with sniff suggests a high RA pressure  estimated at 15 mmHg or greater.  No pericardial effusion is present.  There is no prior study for direct comparison.  _____________________________________________________________________________  __        Left Ventricle  Left ventricular size is normal. Left ventricular wall thickness is normal.  The Ejection Fraction is estimated at 55-60%. Diastolic function not assessed  due to atrial fibrillation. Inferior wall hypokinesis is present.     Right Ventricle  Right ventricular function, chamber size, wall motion, and thickness are  normal.     Atria  Both atria appear normal. The atrial septum is intact as assessed by color  Doppler .     Mitral Valve  The mitral valve is normal. Trace to mild mitral insufficiency is present.     Aortic Valve  Aortic valve is normal in structure and function.        Tricuspid Valve  Moderate to severe tricuspid insufficiency is present. Right ventricular  systolic pressure is 60mmHg above the right atrial pressure.     Pulmonic Valve  The pulmonic valve is normal.     Vessels  The pulmonary artery and bifurcation cannot be assessed. Ascending aorta 3.7  cm. Sinuses of Valsalva 3.8 cm. IVC diameter >2.1 cm collapsing <50% with  sniff suggests a high RA pressure estimated at 15 mmHg or greater.     Pericardium  No pericardial effusion is present.     Compared to Previous Study  There is no prior study for direct comparison.      _____________________________________________________________________________  __  MMode/2D Measurements & Calculations  IVSd: 0.77 cm  LVIDd: 4.8 cm  LVIDs: 3.1 cm  LVPWd: 0.94 cm  FS: 36.3 %     LV mass(C)d: 138.9 grams  LV mass(C)dI: 60.4 grams/m2  Ao root diam: 3.8 cm  asc Aorta Diam: 3.7 cm  LVOT diam: 2.3 cm  LVOT area: 4.2 cm2  LA Volume (BP): 60.3 ml  LA Volume Index (BP): 26.2 ml/m2  RWT: 0.39        Doppler Measurements & Calculations  MV E max sebastian: 142.0 cm/sec  MV A max sebastian: 63.0 cm/sec  MV E/A: 2.3     MV dec slope: 1023 cm/sec2  MV dec time: 0.14 sec  Ao V2 max: 94.8 cm/sec  Ao max P.0 mmHg  Ao V2 mean: 67.0 cm/sec  Ao mean P.0 mmHg  Ao V2 VTI: 20.3 cm  ADDISON(V,D): 2.7 cm2  LV V1 max P.6 mmHg  LV V1 max: 62.4 cm/sec  TR max sebastian: 386.7 cm/sec  TR max P.0 mmHg  AV Sebastian Ratio (DI): 0.66  Medial E/e': 26.6           _____________________________________________________________________________  __           Report approved by: Brenden Puente 2020 11:18 AM            Discharge Medications   Current Discharge Medication List      START taking these medications    Details   amoxicillin-clavulanate (AUGMENTIN) 875-125 MG tablet Take 1 tablet by mouth every 12 hours for 7 days  Qty: 14 tablet, Refills: 0    Associated Diagnoses: Skin infection         CONTINUE these medications which have NOT CHANGED    Details   furosemide (LASIX) 80 MG tablet Take 80 mg by mouth 2 times daily       levothyroxine (SYNTHROID/LEVOTHROID) 75 MCG tablet Take 75 mcg by mouth daily       metoprolol succinate ER (TOPROL-XL) 100 MG 24 hr tablet Take 200 mg by mouth daily       potassium chloride ER (K-TAB/KLOR-CON) 10 MEQ CR tablet Take 20 mEq by mouth 2 times daily       pramipexole (MIRAPEX) 0.125 MG tablet Take 0.125 mg by mouth 2 times daily       primidone (MYSOLINE) 50 MG tablet Take 25 mg by mouth daily       rivaroxaban ANTICOAGULANT (XARELTO) 15 MG TABS tablet TAKE 1 TABLET BY MOUTH ONCE DAILY WITH  BREAKFAST.      traMADol (ULTRAM) 50 MG tablet Take 50 mg by mouth 2 times daily as needed       Vitamin D3 (CHOLECALCIFEROL) 125 MCG (5000 UT) tablet Take 1 tablet by mouth daily         STOP taking these medications       zolpidem (AMBIEN) 10 MG tablet Comments:   Reason for Stopping:             Allergies   No Known Allergies

## 2020-11-13 NOTE — PROGRESS NOTES
Dressing changed on bilateral LEs. Some areas weeping. +3 edema. Redness and warmth noted. Telfa non-stick dressings, covered with 4x4 gauze and wrapped with Kerlix, after areas cleansed with saline. Netting placed over Kerlix to keep in place.

## 2020-11-13 NOTE — PLAN OF CARE
Problem: Adult Inpatient Plan of Care  Goal: Plan of Care Review  Outcome: Adequate for Discharge  Flowsheets (Taken 11/13/2020 0723)  Plan of Care Reviewed With: patient  Goal: Patient-Specific Goal (Individualized)  Outcome: Adequate for Discharge  Goal: Absence of Hospital-Acquired Illness or Injury  Outcome: Adequate for Discharge  Intervention: Identify and Manage Fall Risk  Recent Flowsheet Documentation  Taken 11/13/2020 0723 by Gabriella Quintero RN  Safety Promotion/Fall Prevention:   safety round/check completed   nonskid shoes/slippers when out of bed   fall prevention program maintained   assistive device/personal items within reach   activity supervised   clutter free environment maintained  Intervention: Prevent Skin Injury  Recent Flowsheet Documentation  Taken 11/13/2020 0723 by Gabriella Quintero RN  Body Position:   position changed independently   weight shifting   supine, head elevated  Intervention: Prevent and Manage VTE (Venous Thromboembolism) Risk  Recent Flowsheet Documentation  Taken 11/13/2020 0723 by Gabriella Quintero RN  VTE Prevention/Management:   anticoagulant therapy maintained   ambulation promoted  Intervention: Prevent Infection  Recent Flowsheet Documentation  Taken 11/13/2020 0723 by Gabriella Quintero RN  Infection Prevention:   hand hygiene promoted   single patient room provided  Goal: Optimal Comfort and Wellbeing  Outcome: Adequate for Discharge  Intervention: Provide Person-Centered Care  Recent Flowsheet Documentation  Taken 11/13/2020 0723 by Gabriella Quintero RN  Trust Relationship/Rapport:   care explained   choices provided   questions answered   questions encouraged   thoughts/feelings acknowledged  Goal: Readiness for Transition of Care  Outcome: Adequate for Discharge     Problem: Fluid Volume Excess  Goal: Fluid Balance  Outcome: Adequate for Discharge     Problem: Pain Acute  Goal: Acceptable Pain Control and Functional Ability  Outcome: Adequate for Discharge  Intervention:  Prevent or Manage Pain  Recent Flowsheet Documentation  Taken 11/13/2020 0723 by Gabriella Quintero RN  Sensory Stimulation Regulation: television on  Intervention: Optimize Psychosocial Wellbeing  Recent Flowsheet Documentation  Taken 11/13/2020 0723 by Gabriella Quintero RN  Supportive Measures: active listening utilized  Diversional Activities: television   Patient being discharged to [facility]. He is alert, but disoriented to time and situation. Wounds on bilateral LEs weeping. +3 edema bilateral Les. Able to transfer with bassist of 1 and walker, gait belt.

## 2020-11-13 NOTE — PROGRESS NOTES
NSG DISCHARGE NOTE    Patient discharged to [facility] living at 2:38 PM via wheel chair. Accompanied by other:[facility] [employer] and staff. Discharge paperwork sent to facility with patient. Nurse-to-nurse report given to Helen. Prescriptions sent to patients preferred pharmacy. All belongings sent with patient. Attempted to call daughter, Melissa, and son, Ian. No answer. Left message requesting Melissa to call back to update on patient's discharge.    RAFAELA ARIAS, RN

## 2020-11-13 NOTE — PROGRESS NOTES
:    Called patient's daughter, Melissa and provided discharge update. Patient is ready to discharge today. Melissa reports that she would like [facility] [employer] ride to be scheduled and that she can be contacted by [employer] to make payment.     Called [employer] and scheduled ride for 1430. [facility] to borrow wheel chair. [employer] will call Melissa for payment.     Called Melissa again and notified her of discharge time.     Provided discharge update to Charge RN and patient.     Patient to be [facility]. Faxed discharge summary to [facility]. Called ROHIT Cervantes at [facility] and left voicemail with discharge update.     No further needs at this time.     JANET Mcdowell on 11/13/2020 at 9:21 AM

## 2020-11-14 NOTE — TELEPHONE ENCOUNTER
Daughter returning calling from a Gabriella.  Writer unable to find note in chart from Gabriella.  Tim was discontinue to home today with daughter to help with wound care and oral antibiotics.  Soon will move into assisted living. Encouraged daughter to call back ig she has any questions.  Asiya Thorpe RN MAN   Triage Nurse Advisor [facility]      Additional Information    Health Information question, no triage required and triager able to answer question    Protocols used: INFORMATION ONLY CALL - NO TRIAGE-P-    COVID 19 Nurse Triage Plan/Patient Instructions    Please be aware that novel coronavirus (COVID-19) may be circulating in the community. If you develop symptoms such as fever, cough, or SOB or if you have concerns about the presence of another infection including coronavirus (COVID-19), please contact your health care provider or visit [email or web address].     Disposition/Instructions    Home care recommended. Follow home care protocol based instructions.    Thank you for taking steps to prevent the spread of this virus.  o Limit your contact with others.  o Wear a simple mask to cover your cough.  o Wash your hands well and often.    Resources    [facility]: About COVID-19: [email or web address]    CDC: What to Do If You're Sick: www.cdc.gov/coronavirus/2019-ncov/about/steps-when-sick.html    CDC: Ending Home Isolation: www.cdc.gov/coronavirus/2019-ncov/hcp/disposition-in-home-patients.html     CDC: Caring for Someone: www.cdc.gov/coronavirus/2019-ncov/if-you-are-sick/care-for-someone.html     [facility]: Interim Guidance for Hospital Discharge to Home: www.health.[facility].[place]/diseases/coronavirus/hcp/hospdischarge.pdf    [facility] clinical trials (COVID-19 research studies): [email or web address]/umn-clinical-trials     Below are the COVID-19 hotlines at the [facility] ([facility]). Interpreters are available.   o For health questions: Call 698-036-6307 or  1-539.690.7336 (7 a.m. to 7 p.m.)  o For questions about schools and childcare: Call 761-291-9854 or 1-721.957.3776 (7 a.m. to 7 p.m.)

## 2023-05-18 NOTE — ANESTHESIA CARE TRANSFER NOTE
Patient comes to clinic for follow up anticoagulation visit.  Last INR on 5/1/23 was 2.1.  Dose maintained.   Today's INR is 4.3 and is above goal range.    Current warfarin total weekly dose of 30 mg verified.  Informed the INR result is above therapeutic range and instructed to hold dose today only. Discussed dose and return date of 5/25/23 for next INR. See Anticoagulation flowsheet.    INR today is 4.3, above range and up from previous INR of 2.1, in 2+weeks on continued dose of 30mg/wk. Denies extra doses but states she may have missed a dose within the last week but \"not sure.\" Denies changes to diet/meds. Denies s/s of bleeding. Reports feeling \"off, not quite right\" the past few days/week. Can't put her finger on it, has to clear her throat more often, maybe a little dizzy. Has not changed her diet at at, no alcohol or Tylenol/Advil. Instructed patient to hold dose today then resume normal TWD of 30mg/wk. Encouraged to have 2 servings this week of greens, states she will have a green salad at lunch. Recheck INR in one week to ensure INR returns to range. AVS given and reviewed. FYI: recent hx of low INRs on 27.5mg/wk (past stable dosing) and hx of high INRs on 35mg/wk.    Dr. Blake is in the office today supervising the treatment.and is the referring provider.    Call your physician or seek medical care immediately if you notice any of the following symptoms of a bleed:   · Red, dark, coffee or cola colored urine  · Red or tar like stools  · Excessive bleeding from gums or nose  · Vomiting coffee colored or bright red material  · Coughing up red tinged sputum  · Severe or unprovoked pain (ex: severe Headache or Abdominal pain)  · Sudden, spontaneous bruising for no reason  · For female patients:  Excessive menstrual bleeding  · A cut that will not stop bleeding within 10-15 mins  · Symptoms associated with abnormal bleeding/high INR reviewed.    Encouraged to avoid activities that may result in a serious  Patient: Tim Gallardo    * No procedures listed *    Diagnosis: * No pre-op diagnosis entered *  Diagnosis Additional Information: No value filed.    Anesthesia Type:   MAC     Note:  Airway :Nasal Cannula  Patient transferred to:ICU  ICU Handoff: Call for PAUSE to initiate/utilize ICU HANDOFF, Identified Patient, Identified Responsible Provider, Reviewed the Pertinent Medical History, Discussed Surgical Course, Reviewed Intra-OP Anesthesia Management and Issues during Anesthesia, Set Expectations for Post Procedure Period and Allowed Opportunity for Questions and Acknowledgement of Understanding      Vitals: (Last set prior to Anesthesia Care Transfer)    [facility] VITALS  11/9/2020 0125 - 11/9/2020 0155      11/9/2020             Resp Rate (observed):  18    EKG:  Sinus bradycardia;PAC's                Electronically Signed By: LUIGI Overton CRNA  November 9, 2020  1:55 AM   fall or injury and verbalizes understanding.    Instructed to contact the clinic with any unusual bleeding or bruising, any changes in medications, diet, health status, lifestyle, or any other changes, questions or concerns. Verbalized understanding of all discussed.

## (undated) RX ORDER — BENZOCAINE/MENTHOL 6 MG-10 MG
LOZENGE MUCOUS MEMBRANE
Status: DISPENSED
Start: 2020-01-01

## (undated) RX ORDER — SODIUM CHLORIDE 9 MG/ML
INJECTION, SOLUTION INTRAVENOUS
Status: DISPENSED
Start: 2020-01-01

## (undated) RX ORDER — QUETIAPINE FUMARATE 25 MG/1
TABLET, FILM COATED ORAL
Status: DISPENSED
Start: 2020-01-01

## (undated) RX ORDER — CEFAZOLIN SODIUM 1 G/50ML
INJECTION, SOLUTION INTRAVENOUS
Status: DISPENSED
Start: 2020-01-01